# Patient Record
Sex: FEMALE | Race: WHITE | NOT HISPANIC OR LATINO | Employment: FULL TIME
[De-identification: names, ages, dates, MRNs, and addresses within clinical notes are randomized per-mention and may not be internally consistent; named-entity substitution may affect disease eponyms.]

---

## 2018-01-27 ENCOUNTER — HEALTH MAINTENANCE LETTER (OUTPATIENT)
Age: 23
End: 2018-01-27

## 2021-01-14 ENCOUNTER — VIRTUAL VISIT (OUTPATIENT)
Dept: FAMILY MEDICINE | Facility: CLINIC | Age: 26
End: 2021-01-14
Payer: COMMERCIAL

## 2021-01-14 DIAGNOSIS — Z11.3 SCREENING FOR STDS (SEXUALLY TRANSMITTED DISEASES): ICD-10-CM

## 2021-01-14 DIAGNOSIS — F33.0 MILD EPISODE OF RECURRENT MAJOR DEPRESSIVE DISORDER (H): Primary | ICD-10-CM

## 2021-01-14 PROCEDURE — 96127 BRIEF EMOTIONAL/BEHAV ASSMT: CPT | Performed by: PHYSICIAN ASSISTANT

## 2021-01-14 PROCEDURE — 99204 OFFICE O/P NEW MOD 45 MIN: CPT | Mod: 95 | Performed by: PHYSICIAN ASSISTANT

## 2021-01-14 RX ORDER — CITALOPRAM HYDROBROMIDE 20 MG/1
20 TABLET ORAL DAILY
Qty: 30 TABLET | Refills: 1 | Status: SHIPPED | OUTPATIENT
Start: 2021-01-14 | End: 2021-03-18

## 2021-01-14 ASSESSMENT — PATIENT HEALTH QUESTIONNAIRE - PHQ9
5. POOR APPETITE OR OVEREATING: NOT AT ALL
SUM OF ALL RESPONSES TO PHQ QUESTIONS 1-9: 4

## 2021-01-14 ASSESSMENT — ANXIETY QUESTIONNAIRES
6. BECOMING EASILY ANNOYED OR IRRITABLE: NOT AT ALL
IF YOU CHECKED OFF ANY PROBLEMS ON THIS QUESTIONNAIRE, HOW DIFFICULT HAVE THESE PROBLEMS MADE IT FOR YOU TO DO YOUR WORK, TAKE CARE OF THINGS AT HOME, OR GET ALONG WITH OTHER PEOPLE: NOT DIFFICULT AT ALL
GAD7 TOTAL SCORE: 0
1. FEELING NERVOUS, ANXIOUS, OR ON EDGE: NOT AT ALL
7. FEELING AFRAID AS IF SOMETHING AWFUL MIGHT HAPPEN: NOT AT ALL
2. NOT BEING ABLE TO STOP OR CONTROL WORRYING: NOT AT ALL
5. BEING SO RESTLESS THAT IT IS HARD TO SIT STILL: NOT AT ALL
3. WORRYING TOO MUCH ABOUT DIFFERENT THINGS: NOT AT ALL

## 2021-01-14 NOTE — PROGRESS NOTES
CAT is a 25 year old who is being evaluated via a billable video visit.      How would you like to obtain your AVS? MyChart  If the video visit is dropped, the invitation should be resent by: Text to cell phone: [phone]  Will anyone else be joining your video visit? No    Video Start Time: 9:06 AM  Assessment & Plan     Mild episode of recurrent major depressive disorder (H)  Restart Celexa- 10mg for 5-7 days and then increase.  Follow up video visit in one month, sooner prn.  She is interested in counseling as well.  Referral made today.  - citalopram (CELEXA) 20 MG tablet; Take 1 tablet (20 mg) by mouth daily  - MENTAL HEALTH REFERRAL  - Adult; Outpatient Treatment; Individual/Couples/Family/Group Therapy/Health Psychology[place] [facility] [place] [facility] [phone]; We will contact you to schedule the appointment or please call with any questions    Screening for STDs (sexually transmitted diseases)  Due- future order.  Due for PAP and physical as well.  - NEISSERIA GONORRHOEA PCR; Future  - CHLAMYDIA TRACHOMATIS PCR; Future          Return in about 1 month (around 2/14/2021) for depression/anxiety med check.    [facility]    Subjective     CAT is a 25 year old who presents to clinic today for the following health issues:    HPI       Depression and Anxiety Follow-Up    How are you doing with your depression since your last visit? Worsened    How are you doing with your anxiety since your last visit?  Worsened     Are you having other symptoms that might be associated with depression or anxiety? Yes:  hopelessness, feeling worthless, irritable, overwhelmed    Have you had a significant life event? Relationship Concerns, Job Concerns and Financial Concerns     Do you have any concerns with your use of alcohol or other drugs? No    Social History     Tobacco Use     Smoking status: Passive Smoke Exposure - Never Smoker     Smokeless tobacco: Never Used      Tobacco comment: second hand exposure-mom smokes in the house.    Substance Use Topics     Alcohol use: No     Alcohol/week: 0.0 standard drinks     Drug use: No     PHQ 9/2/2015 1/14/2021   PHQ-9 Total Score 2 4   Q9: Thoughts of better off dead/self-harm past 2 weeks Not at all Not at all     DORIS-7 SCORE 8/6/2015 9/2/2015 1/14/2021   Total Score 2 - -   Total Score - 1 0         How many servings of fruits and vegetables do you eat daily?  2-3    On average, how many sweetened beverages do you drink each day (Examples: soda, juice, sweet tea, etc.  Do NOT count diet or artificially sweetened beverages)?   0    How many days per week do you exercise enough to make your heart beat faster? 3 or less    How many minutes a day do you exercise enough to make your heart beat faster? 9 or less    How many days per week do you miss taking your medication? 0    Cat is calling in today via video call to discuss restarting her antidepressant medication.  She has been on Celexa in the past- last taken when she was about 20 which was 5 years ago.  She had to stop when she lost her insurance coverage.  She has now been struggling with her mood over several years and is interested in restarting her medication.      Review of Systems   Constitutional, HEENT, cardiovascular, pulmonary, gi and gu systems are negative, except as otherwise noted.      Objective           Vitals:  No vitals were obtained today due to virtual visit.    Physical Exam   GENERAL: Healthy, alert and no distress  EYES: Eyes grossly normal to inspection.  No discharge or erythema, or obvious scleral/conjunctival abnormalities.  RESP: No audible wheeze, cough, or visible cyanosis.  No visible retractions or increased work of breathing.    SKIN: Visible skin clear. No significant rash, abnormal pigmentation or lesions.  NEURO: Cranial nerves grossly intact.  Mentation and speech appropriate for age.  PSYCH: Mentation appears normal, affect normal/bright,  judgement and insight intact, normal speech and appearance well-groomed.          Video-Visit Details    Type of service:  Video Visit    Video End Time:9:16 AM    Originating Location (pt. Location): Home    Distant Location (provider location):  [facility]     Platform used for Video Visit: Jayesh

## 2021-01-15 ASSESSMENT — ANXIETY QUESTIONNAIRES: GAD7 TOTAL SCORE: 0

## 2021-03-18 ENCOUNTER — OFFICE VISIT (OUTPATIENT)
Dept: FAMILY MEDICINE | Facility: CLINIC | Age: 26
End: 2021-03-18
Payer: COMMERCIAL

## 2021-03-18 VITALS
TEMPERATURE: 98.1 F | HEART RATE: 103 BPM | WEIGHT: 261.1 LBS | HEIGHT: 63 IN | SYSTOLIC BLOOD PRESSURE: 108 MMHG | DIASTOLIC BLOOD PRESSURE: 78 MMHG | BODY MASS INDEX: 46.26 KG/M2 | OXYGEN SATURATION: 97 % | RESPIRATION RATE: 17 BRPM

## 2021-03-18 DIAGNOSIS — F33.0 MILD EPISODE OF RECURRENT MAJOR DEPRESSIVE DISORDER (H): ICD-10-CM

## 2021-03-18 PROCEDURE — 99213 OFFICE O/P EST LOW 20 MIN: CPT | Performed by: PHYSICIAN ASSISTANT

## 2021-03-18 RX ORDER — CITALOPRAM HYDROBROMIDE 20 MG/1
20 TABLET ORAL DAILY
Qty: 90 TABLET | Refills: 0 | Status: SHIPPED | OUTPATIENT
Start: 2021-03-18 | End: 2021-07-05

## 2021-03-18 ASSESSMENT — ANXIETY QUESTIONNAIRES
3. WORRYING TOO MUCH ABOUT DIFFERENT THINGS: NOT AT ALL
7. FEELING AFRAID AS IF SOMETHING AWFUL MIGHT HAPPEN: NOT AT ALL
6. BECOMING EASILY ANNOYED OR IRRITABLE: SEVERAL DAYS
2. NOT BEING ABLE TO STOP OR CONTROL WORRYING: NOT AT ALL
IF YOU CHECKED OFF ANY PROBLEMS ON THIS QUESTIONNAIRE, HOW DIFFICULT HAVE THESE PROBLEMS MADE IT FOR YOU TO DO YOUR WORK, TAKE CARE OF THINGS AT HOME, OR GET ALONG WITH OTHER PEOPLE: NOT DIFFICULT AT ALL
1. FEELING NERVOUS, ANXIOUS, OR ON EDGE: SEVERAL DAYS
5. BEING SO RESTLESS THAT IT IS HARD TO SIT STILL: NOT AT ALL
GAD7 TOTAL SCORE: 2

## 2021-03-18 ASSESSMENT — MIFFLIN-ST. JEOR: SCORE: 1903.47

## 2021-03-18 ASSESSMENT — PATIENT HEALTH QUESTIONNAIRE - PHQ9
5. POOR APPETITE OR OVEREATING: NOT AT ALL
SUM OF ALL RESPONSES TO PHQ QUESTIONS 1-9: 4

## 2021-03-18 NOTE — PROGRESS NOTES
"    Assessment & Plan     Mild episode of recurrent major depressive disorder (H)  Improved.  She will continue with 20mg dose. She has a counseling appointment set up for end of the month. Follow up appointment made for physical next month.  She is due for PAP which has never been done.  Will follow up regarding mood at that time as well.  - citalopram (CELEXA) 20 MG tablet; Take 1 tablet (20 mg) by mouth daily       BMI:   Estimated body mass index is 46.25 kg/m  as calculated from the following:    Height as of this encounter: 1.6 m (5' 3\").    Weight as of this encounter: 118.4 kg (261 lb 1.6 oz).   Weight management plan: Discussed healthy diet and exercise guidelines      Return in about 1 month (around 4/18/2021) for Physical Exam- Wellness.    KAITLYN Desir [facility] BEATRIS Mcintyre is a 25 year old who presents for the following health issues    HPI     Depression Followup    How are you doing with your depression since your last visit? Improved, not as on edge with everything. Not as irritable. Not as much family drama which helps.    Are you having other symptoms that might be associated with depression? No    Have you had a significant life event?  OTHER: 16 yr old cousin had a baby     Are you feeling anxious or having panic attacks?   No    Do you have any concerns with your use of alcohol or other drugs? No    Social History     Tobacco Use     Smoking status: Passive Smoke Exposure - Never Smoker     Smokeless tobacco: Never Used     Tobacco comment: second hand exposure-mom smokes in the house.    Substance Use Topics     Alcohol use: No     Alcohol/week: 0.0 standard drinks     Drug use: No     PHQ 9/2/2015 1/14/2021 3/18/2021   PHQ-9 Total Score 2 4 4   Q9: Thoughts of better off dead/self-harm past 2 weeks Not at all Not at all Not at all     DORIS-7 SCORE 9/2/2015 1/14/2021 3/18/2021   Total Score - - -   Total Score 1 0 2         Review of Systems " "  Constitutional, HEENT, cardiovascular, pulmonary, gi and gu systems are negative, except as otherwise noted.      Objective    /78 (BP Location: Right arm, Patient Position: Sitting, Cuff Size: Adult Large)   Pulse 103   Temp 98.1  F (36.7  C) (Oral)   Resp 17   Ht 1.6 m (5' 3\")   Wt 118.4 kg (261 lb 1.6 oz)   LMP 03/11/2021   SpO2 97%   BMI 46.25 kg/m    Body mass index is 46.25 kg/m .  Physical Exam   GENERAL: healthy, alert and no distress  MS: no gross musculoskeletal defects noted, no edema  SKIN: no suspicious lesions or rashes  PSYCH: mentation appears normal, affect normal/bright        "

## 2021-03-19 ASSESSMENT — ANXIETY QUESTIONNAIRES: GAD7 TOTAL SCORE: 2

## 2021-03-20 ENCOUNTER — HEALTH MAINTENANCE LETTER (OUTPATIENT)
Age: 26
End: 2021-03-20

## 2021-03-31 ENCOUNTER — VIRTUAL VISIT (OUTPATIENT)
Dept: PSYCHOLOGY | Facility: CLINIC | Age: 26
End: 2021-03-31
Payer: COMMERCIAL

## 2021-03-31 DIAGNOSIS — F43.21 ADJUSTMENT DISORDER WITH DEPRESSED MOOD: Primary | ICD-10-CM

## 2021-03-31 PROCEDURE — 90791 PSYCH DIAGNOSTIC EVALUATION: CPT | Mod: 95 | Performed by: SOCIAL WORKER

## 2021-03-31 ASSESSMENT — ANXIETY QUESTIONNAIRES
6. BECOMING EASILY ANNOYED OR IRRITABLE: NOT AT ALL
3. WORRYING TOO MUCH ABOUT DIFFERENT THINGS: NOT AT ALL
2. NOT BEING ABLE TO STOP OR CONTROL WORRYING: NOT AT ALL
IF YOU CHECKED OFF ANY PROBLEMS ON THIS QUESTIONNAIRE, HOW DIFFICULT HAVE THESE PROBLEMS MADE IT FOR YOU TO DO YOUR WORK, TAKE CARE OF THINGS AT HOME, OR GET ALONG WITH OTHER PEOPLE: NOT DIFFICULT AT ALL
1. FEELING NERVOUS, ANXIOUS, OR ON EDGE: NOT AT ALL
7. FEELING AFRAID AS IF SOMETHING AWFUL MIGHT HAPPEN: NOT AT ALL
5. BEING SO RESTLESS THAT IT IS HARD TO SIT STILL: NOT AT ALL
GAD7 TOTAL SCORE: 0

## 2021-03-31 ASSESSMENT — COLUMBIA-SUICIDE SEVERITY RATING SCALE - C-SSRS
1. IN THE PAST MONTH, HAVE YOU WISHED YOU WERE DEAD OR WISHED YOU COULD GO TO SLEEP AND NOT WAKE UP?: NO
ATTEMPT PAST THREE MONTHS: NO
ATTEMPT LIFETIME: NO
1. IN THE PAST MONTH, HAVE YOU WISHED YOU WERE DEAD OR WISHED YOU COULD GO TO SLEEP AND NOT WAKE UP?: NO
2. HAVE YOU ACTUALLY HAD ANY THOUGHTS OF KILLING YOURSELF?: NO
2. HAVE YOU ACTUALLY HAD ANY THOUGHTS OF KILLING YOURSELF LIFETIME?: NO

## 2021-03-31 ASSESSMENT — PATIENT HEALTH QUESTIONNAIRE - PHQ9
5. POOR APPETITE OR OVEREATING: NOT AT ALL
SUM OF ALL RESPONSES TO PHQ QUESTIONS 1-9: 1

## 2021-04-01 ASSESSMENT — ANXIETY QUESTIONNAIRES: GAD7 TOTAL SCORE: 0
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
## 2021-04-05 ENCOUNTER — OFFICE VISIT (OUTPATIENT)
Dept: FAMILY MEDICINE | Facility: CLINIC | Age: 26
End: 2021-04-05
Payer: COMMERCIAL

## 2021-04-05 VITALS
WEIGHT: 258.9 LBS | DIASTOLIC BLOOD PRESSURE: 72 MMHG | OXYGEN SATURATION: 98 % | TEMPERATURE: 98.5 F | SYSTOLIC BLOOD PRESSURE: 110 MMHG | HEIGHT: 62 IN | BODY MASS INDEX: 47.64 KG/M2 | HEART RATE: 95 BPM | RESPIRATION RATE: 17 BRPM

## 2021-04-05 DIAGNOSIS — F33.0 MILD EPISODE OF RECURRENT MAJOR DEPRESSIVE DISORDER (H): ICD-10-CM

## 2021-04-05 DIAGNOSIS — Z12.4 CERVICAL CANCER SCREENING: ICD-10-CM

## 2021-04-05 DIAGNOSIS — Z00.00 ENCOUNTER FOR ROUTINE ADULT HEALTH EXAMINATION WITHOUT ABNORMAL FINDINGS: Primary | ICD-10-CM

## 2021-04-05 DIAGNOSIS — Z11.3 SCREENING FOR STDS (SEXUALLY TRANSMITTED DISEASES): ICD-10-CM

## 2021-04-05 DIAGNOSIS — E66.01 MORBID OBESITY (H): ICD-10-CM

## 2021-04-05 LAB
ALBUMIN SERPL-MCNC: 3.5 G/DL (ref 3.4–5)
ALP SERPL-CCNC: 67 U/L (ref 40–150)
ALT SERPL W P-5'-P-CCNC: 34 U/L (ref 0–50)
ANION GAP SERPL CALCULATED.3IONS-SCNC: 3 MMOL/L (ref 3–14)
AST SERPL W P-5'-P-CCNC: 14 U/L (ref 0–45)
BILIRUB SERPL-MCNC: 0.4 MG/DL (ref 0.2–1.3)
BUN SERPL-MCNC: 9 MG/DL (ref 7–30)
CALCIUM SERPL-MCNC: 8.8 MG/DL (ref 8.5–10.1)
CHLORIDE SERPL-SCNC: 108 MMOL/L (ref 94–109)
CHOLEST SERPL-MCNC: 197 MG/DL
CO2 SERPL-SCNC: 28 MMOL/L (ref 20–32)
CREAT SERPL-MCNC: 0.7 MG/DL (ref 0.52–1.04)
ERYTHROCYTE [DISTWIDTH] IN BLOOD BY AUTOMATED COUNT: 12.7 % (ref 10–15)
GFR SERPL CREATININE-BSD FRML MDRD: >90 ML/MIN/{1.73_M2}
GLUCOSE SERPL-MCNC: 92 MG/DL (ref 70–99)
HCT VFR BLD AUTO: 41.8 % (ref 35–47)
HDLC SERPL-MCNC: 49 MG/DL
HGB BLD-MCNC: 13.5 G/DL (ref 11.7–15.7)
LDLC SERPL CALC-MCNC: 123 MG/DL
MCH RBC QN AUTO: 29.9 PG (ref 26.5–33)
MCHC RBC AUTO-ENTMCNC: 32.3 G/DL (ref 31.5–36.5)
MCV RBC AUTO: 93 FL (ref 78–100)
NONHDLC SERPL-MCNC: 148 MG/DL
PLATELET # BLD AUTO: 304 10E9/L (ref 150–450)
POTASSIUM SERPL-SCNC: 3.5 MMOL/L (ref 3.4–5.3)
PROT SERPL-MCNC: 7.9 G/DL (ref 6.8–8.8)
RBC # BLD AUTO: 4.52 10E12/L (ref 3.8–5.2)
SODIUM SERPL-SCNC: 139 MMOL/L (ref 133–144)
TRIGL SERPL-MCNC: 126 MG/DL
TSH SERPL DL<=0.005 MIU/L-ACNC: 2.31 MU/L (ref 0.4–4)
WBC # BLD AUTO: 7.5 10E9/L (ref 4–11)

## 2021-04-05 PROCEDURE — 36415 COLL VENOUS BLD VENIPUNCTURE: CPT | Performed by: PHYSICIAN ASSISTANT

## 2021-04-05 PROCEDURE — 80061 LIPID PANEL: CPT | Performed by: PHYSICIAN ASSISTANT

## 2021-04-05 PROCEDURE — 80053 COMPREHEN METABOLIC PANEL: CPT | Performed by: PHYSICIAN ASSISTANT

## 2021-04-05 PROCEDURE — G0145 SCR C/V CYTO,THINLAYER,RESCR: HCPCS | Performed by: PHYSICIAN ASSISTANT

## 2021-04-05 PROCEDURE — 85027 COMPLETE CBC AUTOMATED: CPT | Performed by: PHYSICIAN ASSISTANT

## 2021-04-05 PROCEDURE — 99395 PREV VISIT EST AGE 18-39: CPT | Performed by: PHYSICIAN ASSISTANT

## 2021-04-05 PROCEDURE — 87591 N.GONORRHOEAE DNA AMP PROB: CPT | Performed by: PHYSICIAN ASSISTANT

## 2021-04-05 PROCEDURE — 84443 ASSAY THYROID STIM HORMONE: CPT | Performed by: PHYSICIAN ASSISTANT

## 2021-04-05 PROCEDURE — 87491 CHLMYD TRACH DNA AMP PROBE: CPT | Performed by: PHYSICIAN ASSISTANT

## 2021-04-05 RX ORDER — CITALOPRAM HYDROBROMIDE 20 MG/1
20 TABLET ORAL DAILY
Qty: 90 TABLET | Refills: 3 | Status: CANCELLED | OUTPATIENT
Start: 2021-04-05

## 2021-04-05 ASSESSMENT — ENCOUNTER SYMPTOMS
NERVOUS/ANXIOUS: 0
PALPITATIONS: 0
EYE PAIN: 0
FREQUENCY: 0
WEAKNESS: 0
PARESTHESIAS: 0
JOINT SWELLING: 0
HEADACHES: 0
DIARRHEA: 0
SHORTNESS OF BREATH: 0
ARTHRALGIAS: 0
HEMATOCHEZIA: 0
CONSTIPATION: 0
HEMATURIA: 0
ABDOMINAL PAIN: 0
NAUSEA: 0
MYALGIAS: 0
DIZZINESS: 0
CHILLS: 0
FEVER: 0
COUGH: 0
DYSURIA: 0
HEARTBURN: 0
SORE THROAT: 0

## 2021-04-05 ASSESSMENT — MIFFLIN-ST. JEOR: SCORE: 1881.58

## 2021-04-05 NOTE — PATIENT INSTRUCTIONS
While the state has opened eligibility to all people, our health system will continue to prioritize those groups who are most at risk of exposure to COVID-19 and/or hospitalization due to COVID-19. Once a larger percentage of these groups are vaccinated, we will open vaccine appointments to a larger group.      We are working hard to begin vaccinating more people against COVID-19. Beginning Wednesday, March 31, we are vaccinating:     Individuals age 50 and older.    All healthcare workers, both paid and unpaid.     People age 16 or 18-49 years with certain medical conditions or disabilities listed in Phase 1b tier 4.    People who identify as one or more of the following high-risk groups: Black/, /[place] Native, [place] , /, and /.     If you fit into one of these categories, please log in to [facility] using this link to see if we have an open appointment and schedule an appointment.      If there are no appointments left, you will be unable to schedule.   If you have technical difficulty using [employer], call [phone] for assistance.      As vaccine supply increases and we are able to open appointments to more groups, we will share that information on our website:  [email or web address]. Check this website to stay up to date on COVID-19 vaccination information.

## 2021-04-05 NOTE — PROGRESS NOTES
SUBJECTIVE:   CC: Lizzy Olmstead is an 25 year old woman who presents for preventive health visit.       Patient has been advised of split billing requirements and indicates understanding: Yes  Healthy Habits:     Getting at least 3 servings of Calcium per day:  Yes    Bi-annual eye exam:  Yes    Dental care twice a year:  NO    Sleep apnea or symptoms of sleep apnea:  None    Diet:  Regular (no restrictions)    Frequency of exercise:  None    Taking medications regularly:  Yes    Medication side effects:  None    PHQ-2 Total Score: 0    Additional concerns today:  No    Patient here today for PAP and physical.  Has not had a PAP done at this point yet.  Was just seen for depression follow up- saw a therapist last week which went well.  Has had one HPV vaccine approx 10-11 years ago- still needs one more to complete series.    Today's PHQ-2 Score:   PHQ-2 ( 1999 Pfizer) 4/5/2021   Q1: Little interest or pleasure in doing things 0   Q2: Feeling down, depressed or hopeless 0   PHQ-2 Score 0   Q1: Little interest or pleasure in doing things Not at all   Q2: Feeling down, depressed or hopeless Not at all   PHQ-2 Score 0       Abuse: Current or Past (Physical, Sexual or Emotional) - No  Do you feel safe in your environment? Yes        Social History     Tobacco Use     Smoking status: Passive Smoke Exposure - Never Smoker     Smokeless tobacco: Never Used     Tobacco comment: second hand exposure-mom smokes in the house.    Substance Use Topics     Alcohol use: No     Alcohol/week: 0.0 standard drinks         Alcohol Use 4/5/2021   Prescreen: >3 drinks/day or >7 drinks/week? No       Reviewed orders with patient.  Reviewed health maintenance and updated orders accordingly - Yes  Lab work is in process  Labs reviewed in EPIC    Breast Cancer Screening:        History of abnormal Pap smear: NO - age 21-29 PAP every 3 years recommended     Reviewed and updated as needed this visit by clinical staff  Tobacco  Allergies  "   Med Hx  Surg Hx  Fam Hx  Soc Hx        Reviewed and updated as needed this visit by Provider                Past Medical History:   Diagnosis Date     NONSPECIFIC MEDICAL HISTORY     allergies      Past Surgical History:   Procedure Laterality Date     [facility] NONSPECIFIC PROCEDURE  12/31/97    L long finger cyst removal       Review of Systems   Constitutional: Negative for chills and fever.   HENT: Negative for congestion, ear pain, hearing loss and sore throat.    Eyes: Negative for pain and visual disturbance.   Respiratory: Negative for cough and shortness of breath.    Cardiovascular: Negative for chest pain, palpitations and peripheral edema.   Gastrointestinal: Negative for abdominal pain, constipation, diarrhea, heartburn, hematochezia and nausea.   Genitourinary: Negative for dysuria, frequency, genital sores, hematuria and urgency.   Musculoskeletal: Negative for arthralgias, joint swelling and myalgias.   Skin: Negative for rash.   Neurological: Negative for dizziness, weakness, headaches and paresthesias.   Psychiatric/Behavioral: Negative for mood changes. The patient is not nervous/anxious.           OBJECTIVE:   /72   Pulse 95   Temp 98.5  F (36.9  C) (Oral)   Resp 17   Ht 1.581 m (5' 2.25\")   Wt 117.4 kg (258 lb 14.4 oz)   LMP 03/11/2021   SpO2 98%   BMI 46.97 kg/m    Physical Exam  GENERAL: healthy, alert and no distress  EYES: Eyes grossly normal to inspection, PERRL and conjunctivae and sclerae normal  HENT: ear canals and TM's normal, nose and mouth without ulcers or lesions  NECK: no adenopathy, no asymmetry, masses, or scars and thyroid normal to palpation  RESP: lungs clear to auscultation - no rales, rhonchi or wheezes  BREAST: normal without masses, tenderness or nipple discharge and no palpable axillary masses or adenopathy  CV: regular rate and rhythm, normal S1 S2, no S3 or S4, no murmur, click or rub, no peripheral edema and peripheral pulses strong  ABDOMEN: soft, " "nontender, no hepatosplenomegaly, no masses and bowel sounds normal   (female): normal female external genitalia, normal urethral meatus, vaginal mucosa pink, moist, well rugated, and normal cervix/adnexa/uterus without masses or discharge  MS: no gross musculoskeletal defects noted, no edema  SKIN: no suspicious lesions or rashes  NEURO: Normal strength and tone, mentation intact and speech normal  PSYCH: mentation appears normal, affect normal/bright    Diagnostic Test Results:  Labs reviewed in Epic    ASSESSMENT/PLAN:   1. Encounter for routine adult health examination without abnormal findings    - Lipid panel reflex to direct LDL Fasting  - Comprehensive metabolic panel (BMP + Alb, Alk Phos, ALT, AST, Total. Bili, TP)  - CBC with platelets  - TSH with free T4 reflex    2. Mild episode of recurrent major depressive disorder (H)  Doing well- will follow up in about 3 months for appointment.  Continue with therapy.    3. Morbid obesity (H)  Weight loss is needed.    4. Cervical cancer screening    - Pap imaged thin layer screen only - recommended age 21 - 24 years    5. Screening for STDs (sexually transmitted diseases)    - Chlamydia trachomatis PCR  - Neisseria gonorrhoeae PCR    Patient has been advised of split billing requirements and indicates understanding: Yes  COUNSELING:  Reviewed preventive health counseling, as reflected in patient instructions    Estimated body mass index is 46.97 kg/m  as calculated from the following:    Height as of this encounter: 1.581 m (5' 2.25\").    Weight as of this encounter: 117.4 kg (258 lb 14.4 oz).    Weight management plan: Discussed healthy diet and exercise guidelines    She reports that she is a non-smoker but has been exposed to tobacco smoke. She has never used smokeless tobacco.      Counseling Resources:  ATP IV Guidelines  Pooled Cohorts Equation Calculator  Breast Cancer Risk Calculator  BRCA-Related Cancer Risk Assessment: FHS-7 Tool  FRAX Risk " Assessment  ICSI Preventive Guidelines  Dietary Guidelines for Americans, 2010  USDA's MyPlate  ASA Prophylaxis  Lung CA Screening    KAITLYN Desir [facility]

## 2021-04-06 LAB
C TRACH DNA SPEC QL NAA+PROBE: NEGATIVE
N GONORRHOEA DNA SPEC QL NAA+PROBE: NEGATIVE
SPECIMEN SOURCE: NORMAL
SPECIMEN SOURCE: NORMAL

## 2021-04-07 LAB
COPATH REPORT: NORMAL
PAP: NORMAL

## 2021-05-19 ENCOUNTER — VIRTUAL VISIT (OUTPATIENT)
Dept: PSYCHOLOGY | Facility: CLINIC | Age: 26
End: 2021-05-19
Payer: COMMERCIAL

## 2021-05-19 DIAGNOSIS — F43.21 ADJUSTMENT DISORDER WITH DEPRESSED MOOD: Primary | ICD-10-CM

## 2021-05-19 DIAGNOSIS — F33.0 MDD (MAJOR DEPRESSIVE DISORDER), RECURRENT EPISODE, MILD (H): ICD-10-CM

## 2021-05-19 PROCEDURE — 90834 PSYTX W PT 45 MINUTES: CPT | Mod: 95 | Performed by: SOCIAL WORKER

## 2021-05-20 NOTE — PROGRESS NOTES
Progress Note    Patient Name: Lizzy Olmstead  Date: May 19, 2021         Service Type: Individual      Session Start Time: 4 PM  session End Time: 445     Session Length: 45    Session #: 2    Attendees: Client attended alone    Service Modality:  Video Visit:      Provider verified identity through the following two step process.  Patient provided:  Patient is known previously to provider    Telemedicine Visit: The patient's condition can be safely assessed and treated via synchronous audio and visual telemedicine encounter.      Reason for Telemedicine Visit: Services only offered telehealth    Originating Site (Patient Location): Patient's home    Distant Site (Provider Location): Provider Remote Setting    Consent:  The patient/guardian has verbally consented to: the potential risks and benefits of telemedicine (video visit) versus in person care; bill my insurance or make self-payment for services provided; and responsibility for payment of non-covered services.     Patient would like the video invitation sent by:  My Chart    Mode of Communication:  Video Conference via [employer]    As the provider I attest to compliance with applicable laws and regulations related to telemedicine.     Treatment Plan Last Reviewed: In progress; update and review plan and CGI and Benson August /21  PHQ-9 / DORIS-7 : Today    DATA  Interactive Complexity: No  Crisis: No       Progress Since Last Session (Related to Symptoms / Goals / Homework):   Symptoms: No change Continue to establish baseline for therapy continues mildly depressed    Homework: N/A      Episode of Care Goals: Minimal progress - PREPARATION (Decided to change - considering how); Intervened by negotiating a change plan and determining options / strategies for behavior change, identifying triggers, exploring social supports, and working towards setting a date to begin behavior change     Current / Ongoing Stressors and  Concerns:   Living with mother sister maternal aunt.  Working part-time at the [employer].  Feeling socially isolated and bothered by stressful dynamics within the family and extended family.     Treatment Objective(s) Addressed in This Session:   Exploring areas for treatment goal planning and focus in therapy.  Client acknowledging doing boundary work would be helpful.     Intervention:   Continuing to build rapport, obtaining more information about history and current situation and relationships within the family.  Client continues to try to maintain a friendly relationship with her biological father which sometimes can spark judgment from others.        ASSESSMENT: Current Emotional / Mental Status (status of significant symptoms):   Risk status (Self / Other harm or suicidal ideation)   Patient denies current fears or concerns for personal safety.   Patient denies current or recent suicidal ideation or behaviors.   Patient denies current or recent homicidal ideation or behaviors.   Patient denies current or recent self injurious behavior or ideation.   Patient denies other safety concerns.   Patient reports there has been no change in risk factors since their last session.     Patient reports there has been no change in protective factors since their last session.     Recommended that patient call 911 or go to the local ED should there be a change in any of these risk factors.     Appearance:   Appropriate    Eye Contact:   Good    Psychomotor Behavior: Normal    Attitude:   Cooperative  Interested Friendly   Orientation:   All   Speech    Rate / Production: Normal/ Responsive Emotional Normal     Volume:  Soft    Mood:    Depressed  Irritable  Sad    Affect:    Appropriate    Thought Content:  Clear  Rumination    Thought Form:  Coherent  Goal Directed  Logical    Insight:    Good      Medication Review:   No changes to current psychiatric medication(s)     Medication Compliance:   Yes     Changes in Health  Issues:   None reported     Chemical Use Review:   Substance Use: Chemical use reviewed, no active concerns identified      Tobacco Use: No current tobacco use.      Diagnosis:  1. Adjustment disorder with depressed mood    2. MDD (major depressive disorder), recurrent episode, mild (H)        Collateral Reports Completed:   Not Applicable    PLAN: (Patient Tasks / Therapist Tasks / Other)  Complete treatment plan below  Returns to clinic in mid June but will begin waiting list for cancels until then.        Margarette Hernandez, [facility] May 19, 2021                                                         ______________________________________________________________________    Treatment Plan    Patient's Name: Lizzy Olmstead  YOB: 1995    Date: May 19, 2021    DSM5 Diagnoses: 296.31 (F33.0) Major Depressive Disorder, Recurrent Episode, Mild _ and With anxious distress  Psychosocial / Contextual Factors: Problematic family of origin history; difficulty managing emotions and relationships and healthy manner.  WHODAS:     Referral / Collaboration:  Referral to another professional/service is not indicated at this time..    Anticipated number of session or this episode of care: Evaluate every 90 days      MeasurableTreatment Goal(s) related to diagnosis / functional impairment(s)  Goal 1: Patient will improve management of boundaries with others    I will know I've met my goal when I can function better in spite of a perception of judgment.      Objective #A (Patient Action)    Patient will compile a list of boundaries that they would like to set with others. Client will utilize these boundaries on a regular basis.  Status: New     Intervention(s)  Therapist will teach Strategies for communicating limits and needs.    Objective #B  Patient will learn & utilize at least 1-2 assertive communication skills weekly.  Status: New     Intervention(s)  Therapist will Teach assertiveness and effective communication  skills.    Objective #C  Patient will Identify negative self-talk and behaviors: challenge core beliefs, myths, and actions.  Status: New     Intervention(s)  Therapist will Facilitate processing of her past connecting to current concerns and assisting client in challenging negative beliefs.  We will also assist client in developing strategies for building trust with others.        Patient has reviewed and agreed to the above plan.      Margarette Hernandez, [facility]  May 20, 2021

## 2021-06-22 ENCOUNTER — VIRTUAL VISIT (OUTPATIENT)
Dept: PSYCHOLOGY | Facility: CLINIC | Age: 26
End: 2021-06-22
Payer: COMMERCIAL

## 2021-06-22 DIAGNOSIS — F33.0 MDD (MAJOR DEPRESSIVE DISORDER), RECURRENT EPISODE, MILD (H): Primary | ICD-10-CM

## 2021-06-22 PROCEDURE — 90834 PSYTX W PT 45 MINUTES: CPT | Mod: 95 | Performed by: SOCIAL WORKER

## 2021-06-22 ASSESSMENT — PATIENT HEALTH QUESTIONNAIRE - PHQ9: SUM OF ALL RESPONSES TO PHQ QUESTIONS 1-9: 0

## 2021-06-22 NOTE — PROGRESS NOTES
Progress Note    Patient Name: Lizzy Olmstead  Date: 6/22, 2021         Service Type: Individual      Session Start Time: 12noon  session End Time: 1245     Session Length: 45    Session #: 3    Attendees: Client attended alone    Service Modality:  Video Visit:      Provider verified identity through the following two step process.  Patient provided:  Patient is known previously to provider    Telemedicine Visit: The patient's condition can be safely assessed and treated via synchronous audio and visual telemedicine encounter.      Reason for Telemedicine Visit: Services only offered telehealth    Originating Site (Patient Location): Patient's home    Distant Site (Provider Location): Provider Remote Setting    Consent:  The patient/guardian has verbally consented to: the potential risks and benefits of telemedicine (video visit) versus in person care; bill my insurance or make self-payment for services provided; and responsibility for payment of non-covered services.     Patient would like the video invitation sent by:  My Chart    Mode of Communication:  Video Conference via [employer]    As the provider I attest to compliance with applicable laws and regulations related to telemedicine.     Treatment Plan Last Reviewed: In progress; update and review plan and CGI and [place] August /21  PHQ-9 / DORIS-7 : Today    DATA  Interactive Complexity: No  Crisis: No       Progress Since Last Session (Related to Symptoms / Goals / Homework):   Symptoms: No change Continue to establish baseline for therapy continues mildly depressed    Homework: N/A      Episode of Care Goals: Minimal progress - PREPARATION (Decided to change - considering how); Intervened by negotiating a change plan and determining options / strategies for behavior change, identifying triggers, exploring social supports, and working towards setting a date to begin behavior change     Current / Ongoing Stressors  and Concerns:   Living with mother sister maternal aunt.  Working part-time at the [employer].  Feeling socially isolated and bothered by stressful dynamics within the family and extended family.     Treatment Objective(s) Addressed in This Session:   Exploring areas for treatment goal planning and focus in therapy.  Client acknowledging doing boundary work would be helpful.  Processing information related to extended family's trauma histories.    Intervention:   Continuing to build rapport, obtaining more information about history and current situation and relationships within the family.  Client continues to try to maintain a friendly relationship with her biological father which sometimes can spark judgment from others.  Today: gave hw for her to reflect and write about ways her family trauma has emotionally impacted her.      ASSESSMENT: Current Emotional / Mental Status (status of significant symptoms):   Risk status (Self / Other harm or suicidal ideation)   Patient denies current fears or concerns for personal safety.   Patient denies current or recent suicidal ideation or behaviors.   Patient denies current or recent homicidal ideation or behaviors.   Patient denies current or recent self injurious behavior or ideation.   Patient denies other safety concerns.   Patient reports there has been no change in risk factors since their last session.     Patient reports there has been no change in protective factors since their last session.     Recommended that patient call 911 or go to the local ED should there be a change in any of these risk factors.     Appearance:   Appropriate    Eye Contact:   Good    Psychomotor Behavior: Normal    Attitude:   Cooperative  Interested Friendly   Orientation:   All   Speech    Rate / Production: Normal/ Responsive Emotional Normal     Volume:  Soft    Mood:    Anxious mild   Affect:    Appropriate    Thought Content:  Clear  Rumination    Thought Form:  Coherent  Goal  Directed  Logical    Insight:    Good      Medication Review:   No changes to current psychiatric medication(s)     Medication Compliance:   Yes     Changes in Health Issues:   None reported     Chemical Use Review:   Substance Use: Chemical use reviewed, no active concerns identified      Tobacco Use: No current tobacco use.      Diagnosis:  1. MDD (major depressive disorder), recurrent episode, mild (H)        Collateral Reports Completed:   Not Applicable    PLAN: (Patient Tasks / Therapist Tasks / Other)  Review tx plan with ct next session  HW given to reflect on ways family trauma has impacted her life..        Margarette Hernandez, [facility] 6/22/2021                                                         ______________________________________________________________________    Treatment Plan    Patient's Name: Lizzy Olmstead  YOB: 1995    Date: May 19, 2021    DSM5 Diagnoses: 296.31 (F33.0) Major Depressive Disorder, Recurrent Episode, Mild _ and With anxious distress  Psychosocial / Contextual Factors: Problematic family of origin history; difficulty managing emotions and relationships and healthy manner.  WHODAS:     Referral / Collaboration:  Referral to another professional/service is not indicated at this time..    Anticipated number of session or this episode of care: Evaluate every 90 days      MeasurableTreatment Goal(s) related to diagnosis / functional impairment(s)  Goal 1: Patient will improve management of boundaries with others    I will know I've met my goal when I can function better in spite of a perception of judgment.      Objective #A (Patient Action)    Patient will compile a list of boundaries that they would like to set with others. Client will utilize these boundaries on a regular basis.  Status: New     Intervention(s)  Therapist will teach Strategies for communicating limits and needs.    Objective #B  Patient will learn & utilize at least 1-2 assertive communication skills  weekly.  Status: New     Intervention(s)  Therapist will Teach assertiveness and effective communication skills.    Objective #C  Patient will Identify negative self-talk and behaviors: challenge core beliefs, myths, and actions.  Status: New     Intervention(s)  Therapist will Facilitate processing of her past connecting to current concerns and assisting client in challenging negative beliefs.  We will also assist client in developing strategies for building trust with others.        Patient has reviewed and agreed to the above plan.      Margarette Hernandez, [facility]  May 20, 2021

## 2021-07-05 ENCOUNTER — OFFICE VISIT (OUTPATIENT)
Dept: FAMILY MEDICINE | Facility: CLINIC | Age: 26
End: 2021-07-05
Payer: COMMERCIAL

## 2021-07-05 VITALS
RESPIRATION RATE: 16 BRPM | SYSTOLIC BLOOD PRESSURE: 102 MMHG | HEART RATE: 88 BPM | TEMPERATURE: 98.3 F | WEIGHT: 263 LBS | BODY MASS INDEX: 46.6 KG/M2 | HEIGHT: 63 IN | DIASTOLIC BLOOD PRESSURE: 60 MMHG | OXYGEN SATURATION: 99 %

## 2021-07-05 DIAGNOSIS — F33.0 MILD EPISODE OF RECURRENT MAJOR DEPRESSIVE DISORDER (H): ICD-10-CM

## 2021-07-05 PROCEDURE — 99213 OFFICE O/P EST LOW 20 MIN: CPT | Performed by: PHYSICIAN ASSISTANT

## 2021-07-05 RX ORDER — CITALOPRAM HYDROBROMIDE 20 MG/1
20 TABLET ORAL DAILY
Qty: 90 TABLET | Refills: 1 | Status: SHIPPED | OUTPATIENT
Start: 2021-07-05

## 2021-07-05 ASSESSMENT — ANXIETY QUESTIONNAIRES
2. NOT BEING ABLE TO STOP OR CONTROL WORRYING: NOT AT ALL
1. FEELING NERVOUS, ANXIOUS, OR ON EDGE: NOT AT ALL
7. FEELING AFRAID AS IF SOMETHING AWFUL MIGHT HAPPEN: NOT AT ALL
4. TROUBLE RELAXING: NOT AT ALL
5. BEING SO RESTLESS THAT IT IS HARD TO SIT STILL: NOT AT ALL
GAD7 TOTAL SCORE: 1
GAD7 TOTAL SCORE: 1
6. BECOMING EASILY ANNOYED OR IRRITABLE: SEVERAL DAYS
3. WORRYING TOO MUCH ABOUT DIFFERENT THINGS: NOT AT ALL
7. FEELING AFRAID AS IF SOMETHING AWFUL MIGHT HAPPEN: NOT AT ALL
GAD7 TOTAL SCORE: 1

## 2021-07-05 ASSESSMENT — PATIENT HEALTH QUESTIONNAIRE - PHQ9
10. IF YOU CHECKED OFF ANY PROBLEMS, HOW DIFFICULT HAVE THESE PROBLEMS MADE IT FOR YOU TO DO YOUR WORK, TAKE CARE OF THINGS AT HOME, OR GET ALONG WITH OTHER PEOPLE: NOT DIFFICULT AT ALL
SUM OF ALL RESPONSES TO PHQ QUESTIONS 1-9: 1
SUM OF ALL RESPONSES TO PHQ QUESTIONS 1-9: 1

## 2021-07-05 ASSESSMENT — MIFFLIN-ST. JEOR: SCORE: 1912.09

## 2021-07-05 NOTE — PROGRESS NOTES
Assessment & Plan     Mild episode of recurrent major depressive disorder (H)  Doing quite well since starting med and patient enjoying therapy sessions.  Follow up 6 months, med refill given.  - citalopram (CELEXA) 20 MG tablet; Take 1 tablet (20 mg) by mouth daily      Return in about 6 months (around 1/5/2022) for depression/anxiety med check.    KAITLYN Desir [facility] BEATRIS Mcintyre is a 25 year old who presents for the following health issues:    History of Present Illness       Mental Health Follow-up:  Patient presents to follow-up on Depression.Patient's depression since last visit has been:  Better  The patient is not having other symptoms associated with depression.      Any significant life events: No  Patient is not feeling anxious or having panic attacks.  Patient has no concerns about alcohol or drug use.     Social History  Tobacco Use    Smoking status: Passive Smoke Exposure - Never Smoker    Smokeless tobacco: Never Used    Tobacco comment: second hand exposure-mom smokes in the house.   Alcohol use: No    Alcohol/week: 0.0 standard drinks  Drug use: No      Today's PHQ-9         PHQ-9 Total Score:     (P) 1   PHQ-9 Q9 Thoughts of better off dead/self-harm past 2 weeks :   (P) Not at all   Thoughts of suicide or self harm:      Self-harm Plan:        Self-harm Action:          Safety concerns for self or others:             Patient here for follow up of medication and mood.  She is doing quite well.  Finding therapy to be very helpful- trying to make it there q2 weeks.  Things are home are still somewhat stressful but she is handling it all better.      Review of Systems   Constitutional, HEENT, cardiovascular, pulmonary, gi and gu systems are negative, except as otherwise noted.      Objective    /60 (BP Location: Right arm, Patient Position: Sitting, Cuff Size: Adult Large)   Pulse 88   Temp 98.3  F (36.8  C) (Oral)   Resp 16   Ht 1.6 m  "(5' 3\")   Wt 119.3 kg (263 lb)   SpO2 99%   BMI 46.59 kg/m    Body mass index is 46.59 kg/m .  Physical Exam   GENERAL: healthy, alert and no distress  MS: no gross musculoskeletal defects noted, no edema  SKIN: no suspicious lesions or rashes  PSYCH: mentation appears normal, affect normal/bright            "

## 2021-07-06 ENCOUNTER — VIRTUAL VISIT (OUTPATIENT)
Dept: PSYCHOLOGY | Facility: CLINIC | Age: 26
End: 2021-07-06
Payer: COMMERCIAL

## 2021-07-06 DIAGNOSIS — F33.0 MDD (MAJOR DEPRESSIVE DISORDER), RECURRENT EPISODE, MILD (H): Primary | ICD-10-CM

## 2021-07-06 PROCEDURE — 90834 PSYTX W PT 45 MINUTES: CPT | Mod: 95 | Performed by: SOCIAL WORKER

## 2021-07-06 ASSESSMENT — ANXIETY QUESTIONNAIRES: GAD7 TOTAL SCORE: 1

## 2021-07-06 ASSESSMENT — PATIENT HEALTH QUESTIONNAIRE - PHQ9: SUM OF ALL RESPONSES TO PHQ QUESTIONS 1-9: 1

## 2021-07-06 NOTE — PROGRESS NOTES
Progress Note    Patient Name: Lizzy Olmstead  Date: 7/6/2021         Service Type: Individual      Session Start Time: 4pm session End Time: 445     Session Length: 45    Session #: 4    Attendees: Client attended alone    Service Modality:  Video Visit:      Provider verified identity through the following two step process.  Patient provided:  Patient is known previously to provider    Telemedicine Visit: The patient's condition can be safely assessed and treated via synchronous audio and visual telemedicine encounter.      Reason for Telemedicine Visit: Services only offered telehealth    Originating Site (Patient Location): Patient's home    Distant Site (Provider Location): Provider Remote Setting    Consent:  The patient/guardian has verbally consented to: the potential risks and benefits of telemedicine (video visit) versus in person care; bill my insurance or make self-payment for services provided; and responsibility for payment of non-covered services.     Patient would like the video invitation sent by:  My Chart    Mode of Communication:  Video Conference via [employer]    As the provider I attest to compliance with applicable laws and regulations related to telemedicine.     Treatment Plan Last Reviewed: In progress; update and review plan and CGI and [place] August /21  PHQ-9 / DORIS-7 : Today    DATA  Interactive Complexity: No  Crisis: No       Progress Since Last Session (Related to Symptoms / Goals / Homework):   Symptoms: No change Continue to establish baseline for therapy continues mildly depressed    Homework: N/A      Episode of Care Goals: Minimal progress - PREPARATION (Decided to change - considering how); Intervened by negotiating a change plan and determining options / strategies for behavior change, identifying triggers, exploring social supports, and working towards setting a date to begin behavior change     Current / Ongoing Stressors and  Concerns:   Living with mother sister maternal aunt.  Working part-time at the [employer].  Feeling socially isolated and bothered by stressful dynamics within the family and extended family.     Treatment Objective(s) Addressed in This Session:   Exploring areas for treatment goal planning and focus in therapy.  Client acknowledging doing boundary work would be helpful.  Processing information related to extended family's trauma histories.   More workplace drama.   Intervention:   Continuing to build rapport, obtaining more information about history and current situation and relationships within the family.  Client continues to try to maintain a friendly relationship with her biological father which sometimes can spark judgment from others.  Today: Really feels her familys trauma impacted her socially and emotionally. Feels poor self esteem and socially awkward. Deciding she does not like peers she grew up with and has begun to branch out. Made big decision to start dating again after one year. Taking drivers test next week after failing it 4x.     ASSESSMENT: Current Emotional / Mental Status (status of significant symptoms):   Risk status (Self / Other harm or suicidal ideation)   Patient denies current fears or concerns for personal safety.   Patient denies current or recent suicidal ideation or behaviors.   Patient denies current or recent homicidal ideation or behaviors.   Patient denies current or recent self injurious behavior or ideation.   Patient denies other safety concerns.   Patient reports there has been no change in risk factors since their last session.     Patient reports there has been no change in protective factors since their last session.     Recommended that patient call 911 or go to the local ED should there be a change in any of these risk factors.     Appearance:   Appropriate    Eye Contact:   Good    Psychomotor Behavior: Normal    Attitude:   Cooperative  Interested  Friendly   Orientation:   All   Speech    Rate / Production: Normal/ Responsive Emotional Normal     Volume:  Soft    Mood:    Anxious mild   Affect:    Appropriate    Thought Content:  Clear  Rumination    Thought Form:  Coherent  Goal Directed  Logical    Insight:    Good      Medication Review:   No changes to current psychiatric medication(s)     Medication Compliance:   Yes     Changes in Health Issues:   None reported     Chemical Use Review:   Substance Use: Chemical use reviewed, no active concerns identified      Tobacco Use: No current tobacco use.      Diagnosis:  1. MDD (major depressive disorder), recurrent episode, mild (H)        Collateral Reports Completed:   Not Applicable    PLAN: (Patient Tasks / Therapist Tasks / Other)  Moving indirection of doing to create self worth        Margarette Hernandez, [facility] 7/6/2021                                                         ______________________________________________________________________    Treatment Plan    Patient's Name: Lizzy Olmstead  YOB: 1995    Date: May 19, 2021    DSM5 Diagnoses: 296.31 (F33.0) Major Depressive Disorder, Recurrent Episode, Mild _ and With anxious distress  Psychosocial / Contextual Factors: Problematic family of origin history; difficulty managing emotions and relationships and healthy manner.  WHODAS:     Referral / Collaboration:  Referral to another professional/service is not indicated at this time..    Anticipated number of session or this episode of care: Evaluate every 90 days      MeasurableTreatment Goal(s) related to diagnosis / functional impairment(s)  Goal 1: Patient will improve management of boundaries with others    I will know I've met my goal when I can function better in spite of a perception of judgment.      Objective #A (Patient Action)    Patient will compile a list of boundaries that they would like to set with others. Client will utilize these boundaries on a regular basis.  Status:  New     Intervention(s)  Therapist will teach Strategies for communicating limits and needs.    Objective #B  Patient will learn & utilize at least 1-2 assertive communication skills weekly.  Status: New     Intervention(s)  Therapist will Teach assertiveness and effective communication skills.    Objective #C  Patient will Identify negative self-talk and behaviors: challenge core beliefs, myths, and actions.  Status: New     Intervention(s)  Therapist will Facilitate processing of her past connecting to current concerns and assisting client in challenging negative beliefs.  We will also assist client in developing strategies for building trust with others.        Patient has reviewed and agreed to the above plan.      Margarette Hernandez, [facility]  May 20, 2021

## 2021-07-21 ENCOUNTER — VIRTUAL VISIT (OUTPATIENT)
Dept: PSYCHOLOGY | Facility: CLINIC | Age: 26
End: 2021-07-21
Payer: COMMERCIAL

## 2021-07-21 DIAGNOSIS — F33.0 MDD (MAJOR DEPRESSIVE DISORDER), RECURRENT EPISODE, MILD (H): Primary | ICD-10-CM

## 2021-07-21 PROCEDURE — 90834 PSYTX W PT 45 MINUTES: CPT | Mod: 95 | Performed by: SOCIAL WORKER

## 2021-07-21 NOTE — PROGRESS NOTES
Progress Note    Patient Name: Lizzy Olmstead  Date: 7/21/2021         Service Type: Individual      Session Start Time: 11am session End Time: 1145     Session Length: 45    Session #: 5    Attendees: Client attended alone    Service Modality:  Video Visit:      Provider verified identity through the following two step process.  Patient provided:  Patient is known previously to provider    Telemedicine Visit: The patient's condition can be safely assessed and treated via synchronous audio and visual telemedicine encounter.      Reason for Telemedicine Visit: Services only offered telehealth    Originating Site (Patient Location): Patient's home    Distant Site (Provider Location): Provider Remote Setting    Consent:  The patient/guardian has verbally consented to: the potential risks and benefits of telemedicine (video visit) versus in person care; bill my insurance or make self-payment for services provided; and responsibility for payment of non-covered services.     Patient would like the video invitation sent by:  My Chart    Mode of Communication:  Video Conference via [employer]    As the provider I attest to compliance with applicable laws and regulations related to telemedicine.     Treatment Plan Last Reviewed: In progress; update and review plan and CGI and [place] August /21  PHQ-9 / DORIS-7 : Today    DATA  Interactive Complexity: No  Crisis: No       Progress Since Last Session (Related to Symptoms / Goals / Homework):   Symptoms: No change Continue to establish baseline for therapy continues mildly depressed    Homework: N/A      Episode of Care Goals: Minimal progress - ACTION (Actively working towards change); Intervened by reinforcing change plan / affirming steps taken     Current / Ongoing Stressors and Concerns:   Living with mother sister maternal aunt.  Working part-time at the [employer].  Feeling socially isolated and bothered by stressful dynamics  within the family and extended family.     Treatment Objective(s) Addressed in This Session:   Exploring areas for treatment goal planning and focus in therapy.  Client acknowledging doing boundary work would be helpful.  Processing information related to extended family's trauma histories.   Difficulty setting boundaries with grandmother who has dementia and mental illness.   Intervention:   Continuing to build rapport, obtaining more information about history and current situation and relationships within the family.  Client continues to try to maintain a friendly relationship with her biological father which sometimes can spark judgment from others.  Today: More upbeat today very talkative.  Given homework to disengage from problematic phone calls when grandmother is triggered.     ASSESSMENT: Current Emotional / Mental Status (status of significant symptoms):   Risk status (Self / Other harm or suicidal ideation)   Patient denies current fears or concerns for personal safety.   Patient denies current or recent suicidal ideation or behaviors.   Patient denies current or recent homicidal ideation or behaviors.   Patient denies current or recent self injurious behavior or ideation.   Patient denies other safety concerns.   Patient reports there has been no change in risk factors since their last session.     Patient reports there has been no change in protective factors since their last session.     Recommended that patient call 911 or go to the local ED should there be a change in any of these risk factors.     Appearance:   Appropriate    Eye Contact:   Good    Psychomotor Behavior: Normal    Attitude:   Cooperative  Interested Friendly   Orientation:   All   Speech    Rate / Production: Normal/ Responsive Emotional Normal     Volume:  Soft    Mood:    Anxious mild   Affect:    Appropriate    Thought Content:  Clear  Rumination    Thought Form:  Coherent  Goal Directed  Logical    Insight:    Good      Medication  Review:   No changes to current psychiatric medication(s)     Medication Compliance:   Yes     Changes in Health Issues:   None reported     Chemical Use Review:   Substance Use: Chemical use reviewed, no active concerns identified      Tobacco Use: No current tobacco use.      Diagnosis:  1. MDD (major depressive disorder), recurrent episode, mild (H)        Collateral Reports Completed:   Not Applicable    PLAN: (Patient Tasks / Therapist Tasks / Other)  Moving indirection of doing to create self worth  Will practice boundary setting with grandmother is problematic phone calls      Margarette Hernandez, [facility] 7/21/2021                                                         ______________________________________________________________________    Treatment Plan    Patient's Name: Lizzy Olmstead  YOB: 1995    Date: May 19, 2021    DSM5 Diagnoses: 296.31 (F33.0) Major Depressive Disorder, Recurrent Episode, Mild _ and With anxious distress  Psychosocial / Contextual Factors: Problematic family of origin history; difficulty managing emotions and relationships and healthy manner.  WHODAS:     Referral / Collaboration:  Referral to another professional/service is not indicated at this time..    Anticipated number of session or this episode of care: Evaluate every 90 days      MeasurableTreatment Goal(s) related to diagnosis / functional impairment(s)  Goal 1: Patient will improve management of boundaries with others    I will know I've met my goal when I can function better in spite of a perception of judgment.      Objective #A (Patient Action)    Patient will compile a list of boundaries that they would like to set with others. Client will utilize these boundaries on a regular basis.  Status: New     Intervention(s)  Therapist will teach Strategies for communicating limits and needs.    Objective #B  Patient will learn & utilize at least 1-2 assertive communication skills weekly.  Status: New      Intervention(s)  Therapist will Teach assertiveness and effective communication skills.    Objective #C  Patient will Identify negative self-talk and behaviors: challenge core beliefs, myths, and actions.  Status: New     Intervention(s)  Therapist will Facilitate processing of her past connecting to current concerns and assisting client in challenging negative beliefs.  We will also assist client in developing strategies for building trust with others.        Patient has reviewed and agreed to the above plan.      Margarette Hernandez, [facility]  May 20, 2021

## 2021-08-03 ENCOUNTER — VIRTUAL VISIT (OUTPATIENT)
Dept: PSYCHOLOGY | Facility: CLINIC | Age: 26
End: 2021-08-03
Payer: COMMERCIAL

## 2021-08-03 DIAGNOSIS — F33.0 MDD (MAJOR DEPRESSIVE DISORDER), RECURRENT EPISODE, MILD (H): Primary | ICD-10-CM

## 2021-08-03 PROCEDURE — 90834 PSYTX W PT 45 MINUTES: CPT | Mod: 95 | Performed by: SOCIAL WORKER

## 2021-08-03 NOTE — PROGRESS NOTES
Progress Note    Patient Name: Lizzy Olmstead  Date: 8/3/21         Service Type: Individual      Session Start Time: 4pm session End Time: 445     Session Length: 45    Session #: 6    Attendees: Client attended alone    Service Modality:  Video Visit:      Provider verified identity through the following two step process.  Patient provided:  Patient is known previously to provider    Telemedicine Visit: The patient's condition can be safely assessed and treated via synchronous audio and visual telemedicine encounter.      Reason for Telemedicine Visit: Services only offered telehealth    Originating Site (Patient Location): Patient's home    Distant Site (Provider Location): Provider Remote Setting    Consent:  The patient/guardian has verbally consented to: the potential risks and benefits of telemedicine (video visit) versus in person care; bill my insurance or make self-payment for services provided; and responsibility for payment of non-covered services.     Patient would like the video invitation sent by:  My Chart    Mode of Communication:  Video Conference via [employer]    As the provider I attest to compliance with applicable laws and regulations related to telemedicine.     Treatment Plan Last Reviewed: In progress; update and review plan and CGI and [place] August /21  PHQ-9 / DORIS-7 : Today    DATA  Interactive Complexity: No  Crisis: No       Progress Since Last Session (Related to Symptoms / Goals / Homework):   Symptoms: No change Continue to establish baseline for therapy continues mildly depressed    Homework: N/A      Episode of Care Goals: Minimal progress - ACTION (Actively working towards change); Intervened by reinforcing change plan / affirming steps taken     Current / Ongoing Stressors and Concerns:   Living with mother sister maternal aunt.  Working part-time at the [employer].  Feeling socially isolated and bothered by stressful dynamics within  the family and extended family.     Treatment Objective(s) Addressed in This Session:   Continued work and progress with setting goals in family and on the job  Client acknowledging doing boundary work is helpful.  Processing information related to extended family's trauma histories.   Difficulty setting boundaries with grandmother who has dementia and mental illness. Isn't getting to processing feelings yet.   Intervention:   Continuing to build rapport, obtaining more information about history and current situation and relationships within the family.  Client continues to try to maintain a friendly relationship with her biological father which sometimes can spark judgment from others.  Today: More upbeat today very talkative.  Able to follow through with hw. Very assertive with others;show leadership qualities.     ASSESSMENT: Current Emotional / Mental Status (status of significant symptoms):   Risk status (Self / Other harm or suicidal ideation)   Patient denies current fears or concerns for personal safety.   Patient denies current or recent suicidal ideation or behaviors.   Patient denies current or recent homicidal ideation or behaviors.   Patient denies current or recent self injurious behavior or ideation.   Patient denies other safety concerns.   Patient reports there has been no change in risk factors since their last session.     Patient reports there has been no change in protective factors since their last session.     Recommended that patient call 911 or go to the local ED should there be a change in any of these risk factors.     Appearance:   Appropriate    Eye Contact:   Good    Psychomotor Behavior: Normal    Attitude:   Cooperative  Interested Friendly   Orientation:   All   Speech    Rate / Production: Normal/ Responsive Emotional Normal     Volume:  Soft    Mood:    Anxious mild   Affect:    Appropriate    Thought Content:  Clear  Rumination    Thought Form:  Coherent  Goal Directed  Logical     Insight:    Good      Medication Review:   No changes to current psychiatric medication(s)     Medication Compliance:   Yes     Changes in Health Issues:   None reported     Chemical Use Review:   Substance Use: Chemical use reviewed, no active concerns identified      Tobacco Use: No current tobacco use.      Diagnosis:  1. MDD (major depressive disorder), recurrent episode, mild (H)        Collateral Reports Completed:   Not Applicable    PLAN: (Patient Tasks / Therapist Tasks / Other)  Moving indirection of doing to create self worth  Will practice boundary setting with grandmother is problematic phone calls as well as with rest of family and with co workers      Margarette Hernandez, [facility] 8/3/21                                                         ______________________________________________________________________    Treatment Plan    Patient's Name: Lizzy Olmstead  YOB: 1995    Date: May 19, 2021    DSM5 Diagnoses: 296.31 (F33.0) Major Depressive Disorder, Recurrent Episode, Mild _ and With anxious distress  Psychosocial / Contextual Factors: Problematic family of origin history; difficulty managing emotions and relationships and healthy manner.  WHODAS:     Referral / Collaboration:  Referral to another professional/service is not indicated at this time..    Anticipated number of session or this episode of care: Evaluate every 90 days      MeasurableTreatment Goal(s) related to diagnosis / functional impairment(s)  Goal 1: Patient will improve management of boundaries with others    I will know I've met my goal when I can function better in spite of a perception of judgment.      Objective #A (Patient Action)    Patient will compile a list of boundaries that they would like to set with others. Client will utilize these boundaries on a regular basis.  Status: New     Intervention(s)  Therapist will teach Strategies for communicating limits and needs.    Objective #B  Patient will learn &  utilize at least 1-2 assertive communication skills weekly.  Status: New     Intervention(s)  Therapist will Teach assertiveness and effective communication skills.    Objective #C  Patient will Identify negative self-talk and behaviors: challenge core beliefs, myths, and actions.  Status: New     Intervention(s)  Therapist will Facilitate processing of her past connecting to current concerns and assisting client in challenging negative beliefs.  We will also assist client in developing strategies for building trust with others.        Patient has reviewed and agreed to the above plan.      [facility] GUADALU[facility] Benavidez  May 20, 2021

## 2021-08-20 ENCOUNTER — VIRTUAL VISIT (OUTPATIENT)
Dept: PSYCHOLOGY | Facility: CLINIC | Age: 26
End: 2021-08-20
Payer: COMMERCIAL

## 2021-08-20 DIAGNOSIS — F33.0 MDD (MAJOR DEPRESSIVE DISORDER), RECURRENT EPISODE, MILD (H): Primary | ICD-10-CM

## 2021-08-20 PROCEDURE — 90834 PSYTX W PT 45 MINUTES: CPT | Mod: 95 | Performed by: SOCIAL WORKER

## 2021-08-20 NOTE — PROGRESS NOTES
Progress Note    Patient Name: Lizzy Olmstead  Date: 8/20/21         Service Type: Individual      Session Start Time: 230pm session End Time: 315     Session Length: 45    Session #: 7    Attendees: Client attended alone    Service Modality:  Video Visit:      Provider verified identity through the following two step process.  Patient provided:  Patient is known previously to provider    Telemedicine Visit: The patient's condition can be safely assessed and treated via synchronous audio and visual telemedicine encounter.      Reason for Telemedicine Visit: Services only offered telehealth    Originating Site (Patient Location): Patient's home    Distant Site (Provider Location): Provider Remote Setting    Consent:  The patient/guardian has verbally consented to: the potential risks and benefits of telemedicine (video visit) versus in person care; bill my insurance or make self-payment for services provided; and responsibility for payment of non-covered services.     Patient would like the video invitation sent by:  My Chart    Mode of Communication:  Video Conference via [employer]    As the provider I attest to compliance with applicable laws and regulations related to telemedicine.     Treatment Plan Last Reviewed: In progress; update and review plan and CGI and [place] 11 /21  PHQ-9 / DORIS-7 : Today    DATA  Interactive Complexity: No  Crisis: No       Progress Since Last Session (Related to Symptoms / Goals / Homework):   Symptoms: stable    Homework: N/A      Episode of Care Goals: Minimal progress - ACTION (Actively working towards change); Intervened by reinforcing change plan / affirming steps taken  Overwhelmed by extended family drama and stressors. Tends to take these on as her own to fix/fight. Continues to work on establishing boundaries.   Current / Ongoing Stressors and Concerns:   Living with mother sister maternal aunt.  Working part-time at the [employer].  Feeling socially isolated and bothered by stressful dynamics within the family and extended family.     Treatment Objective(s) Addressed in This Session:   Continued work and progress with setting goals in family and on the job  Client acknowledging doing boundary work is helpful.  Processing information related to extended family's trauma histories.   Some redirection today towards self and her own life goals.   Intervention:   Continuing to build rapport, obtaining more information about history and current situation and relationships within the family.  Client continues to try to maintain a friendly relationship with her biological father which sometimes can spark judgment from others.  Today: turns 26 in November. Discussion around what she wants out of life and where shes going with work and/or education. Agreeing her first step is to take and pass drivers lic test she has tried to pass 5 times.   ASSESSMENT: Current Emotional / Mental Status (status of significant symptoms):   Risk status (Self / Other harm or suicidal ideation)   Patient denies current fears or concerns for personal safety.   Patient denies current or recent suicidal ideation or behaviors.   Patient denies current or recent homicidal ideation or behaviors.   Patient denies current or recent self injurious behavior or ideation.   Patient denies other safety concerns.   Patient reports there has been no change in risk factors since their last session.     Patient reports there has been no change in protective factors since their last session.     Recommended that patient call 911 or go to the local ED should there be a change in any of these risk factors.     Appearance:   Appropriate    Eye Contact:   Good    Psychomotor Behavior: Normal    Attitude:   Cooperative  Interested Friendly   Orientation:   All   Speech    Rate / Production: Normal/ Responsive Emotional Normal     Volume:  Soft    Mood:    Anxious  mild   Affect:    Appropriate    Thought Content:  Clear  Rumination    Thought Form:  Coherent  Goal Directed  Logical    Insight:    Good      Medication Review:   No changes to current psychiatric medication(s)     Medication Compliance:   Yes     Changes in Health Issues:   None reported     Chemical Use Review:   Substance Use: Chemical use reviewed, no active concerns identified      Tobacco Use: No current tobacco use.      Diagnosis:  1. MDD (major depressive disorder), recurrent episode, mild (H)        Collateral Reports Completed:   Not Applicable    PLAN: (Patient Tasks / Therapist Tasks / Other)  Moving indirection of doing to create self worth  Will practice boundary setting with grandmother is problematic phone calls as well as with rest of family and with co workers  Will schedule drivers exam    Margarette Hernandez, [facility] 8/20/21                                                         ______________________________________________________________________    Treatment Plan    Patient's Name: Lizzy Olmstead  YOB: 1995    Date: May 19, 2021;8/21    DSM5 Diagnoses: 296.31 (F33.0) Major Depressive Disorder, Recurrent Episode, Mild _ and With anxious distress  Psychosocial / Contextual Factors: Problematic family of origin history; difficulty managing emotions and relationships and healthy manner.  WHODAS:     Referral / Collaboration:  Referral to another professional/service is not indicated at this time..    Anticipated number of session or this episode of care: Evaluate every 90 days      MeasurableTreatment Goal(s) related to diagnosis / functional impairment(s)  Goal 1: Patient will improve management of boundaries with others    I will know I've met my goal when I can function better in spite of a perception of judgment.      Objective #A (Patient Action)    Patient will compile a list of boundaries that they would like to set with others. Client will utilize these boundaries on a  regular basis.  Status: continue     Intervention(s)  Therapist will teach Strategies for communicating limits and needs.    Objective #B  Patient will learn & utilize at least 1-2 assertive communication skills weekly.  Status: cont     Intervention(s)  Therapist will Teach assertiveness and effective communication skills.    Objective #C  Patient will Identify negative self-talk and behaviors: challenge core beliefs, myths, and actions.  Status: cont     Intervention(s)  Therapist will Facilitate processing of her past connecting to current concerns and assisting client in challenging negative beliefs.  We will also assist client in developing strategies for building trust with others.        Patient has reviewed and agreed to the above plan.      Margarette Hernandez, [facility]  May 20, 2021

## 2021-09-03 ENCOUNTER — VIRTUAL VISIT (OUTPATIENT)
Dept: PSYCHOLOGY | Facility: CLINIC | Age: 26
End: 2021-09-03
Payer: COMMERCIAL

## 2021-09-03 DIAGNOSIS — F33.0 MDD (MAJOR DEPRESSIVE DISORDER), RECURRENT EPISODE, MILD (H): Primary | ICD-10-CM

## 2021-09-03 PROCEDURE — 90834 PSYTX W PT 45 MINUTES: CPT | Mod: 95 | Performed by: SOCIAL WORKER

## 2021-09-03 ASSESSMENT — PATIENT HEALTH QUESTIONNAIRE - PHQ9: SUM OF ALL RESPONSES TO PHQ QUESTIONS 1-9: 1

## 2021-09-04 ENCOUNTER — HEALTH MAINTENANCE LETTER (OUTPATIENT)
Age: 26
End: 2021-09-04

## 2021-09-06 NOTE — PROGRESS NOTES
Progress Note    Patient Name: Lizzy Olmstead  Date: 9/3/21         Service Type: Individual      Session Start Time: 230pm session End Time: 315     Session Length: 45    Session #: 8    Attendees: Client attended alone    Service Modality:  Video Visit:      Provider verified identity through the following two step process.  Patient provided:  Patient is known previously to provider    Telemedicine Visit: The patient's condition can be safely assessed and treated via synchronous audio and visual telemedicine encounter.      Reason for Telemedicine Visit: Services only offered telehealth    Originating Site (Patient Location): Patient's home    Distant Site (Provider Location): Provider Remote Setting    Consent:  The patient/guardian has verbally consented to: the potential risks and benefits of telemedicine (video visit) versus in person care; bill my insurance or make self-payment for services provided; and responsibility for payment of non-covered services.     Patient would like the video invitation sent by:  My Chart    Mode of Communication:  Video Conference via [email or web address]    As the provider I attest to compliance with applicable laws and regulations related to telemedicine.     Treatment Plan Last Reviewed: In progress; update and review plan and CGI and [place] 11 /21  PHQ-9 / DORIS-7 : Today    DATA  Interactive Complexity: No  Crisis: No       Progress Since Last Session (Related to Symptoms / Goals / Homework):   Symptoms: stable    Homework: scheduled drivers test/exam for 1/22      Episode of Care Goals: Minimal progress - ACTION (Actively working towards change); Intervened by reinforcing change plan / affirming steps taken  Overwhelmed by extended family drama and stressors. Tends to take these on as her own to fix/fight. Continues to work on establishing boundaries.   Current / Ongoing Stressors and Concerns:   Living with mother sister maternal aunt.   Working part-time at the [employer].  Feeling socially isolated and bothered by stressful dynamics within the family and extended family.     Treatment Objective(s) Addressed in This Session:   Continued work and progress with setting goals in family and on the job  Client acknowledging doing boundary work is helpful.  Processing information related to extended family's trauma histories.   Some redirection towards self and her own life goals. This is difficult for client as she tends to overfocus on extended family's problems. Wonders whether this keeps her from making changes she needs.    Intervention:   Continuing to build rapport, obtaining more information about history and current situation and relationships within the family.  Client continues to try to maintain a friendly relationship with her biological father which sometimes can spark judgment from others.  Today: Goal is to get license and move out of family home to create physical boundary down the road. Distant gmo dx with cancer and a cousin OD'd recently. Ct processing impact of this today in session.  ASSESSMENT: Current Emotional / Mental Status (status of significant symptoms):   Risk status (Self / Other harm or suicidal ideation)   Patient denies current fears or concerns for personal safety.   Patient denies current or recent suicidal ideation or behaviors.   Patient denies current or recent homicidal ideation or behaviors.   Patient denies current or recent self injurious behavior or ideation.   Patient denies other safety concerns.   Patient reports there has been no change in risk factors since their last session.     Patient reports there has been no change in protective factors since their last session.     Recommended that patient call 911 or go to the local ED should there be a change in any of these risk factors.     Appearance:   Appropriate    Eye Contact:   Good    Psychomotor Behavior: Normal    Attitude:   Cooperative  Interested  Friendly   Orientation:   All   Speech    Rate / Production: Normal/ Responsive Emotional Normal     Volume:  Soft    Mood:    Anxious mild irritable   Affect:    Appropriate    Thought Content:  Clear  Rumination    Thought Form:  Coherent  Goal Directed  Logical    Insight:    Good      Medication Review:   No changes to current psychiatric medication(s)     Medication Compliance:   Yes     Changes in Health Issues:   None reported     Chemical Use Review:   Substance Use: Chemical use reviewed, no active concerns identified      Tobacco Use: No current tobacco use.      Diagnosis:  1. MDD (major depressive disorder), recurrent episode, mild (H)        Collateral Reports Completed:   Not Applicable    PLAN: (Patient Tasks / Therapist Tasks / Other)  Moving indirection of doing to create self worth  Will practice boundary setting with grandmother is problematic phone calls as well as with rest of family and with co workers  Will continue to practice coping skills that help her re regulate    Margarettejovita Ruizchad, [facility] 9/3/21                                                         ______________________________________________________________________    Treatment Plan    Patient's Name: Lizzy Olmstead  YOB: 1995    Date: May 19, 2021;8/21    DSM5 Diagnoses: 296.31 (F33.0) Major Depressive Disorder, Recurrent Episode, Mild _ and With anxious distress  Psychosocial / Contextual Factors: Problematic family of origin history; difficulty managing emotions and relationships and healthy manner.  WHODAS:     Referral / Collaboration:  Referral to another professional/service is not indicated at this time..    Anticipated number of session or this episode of care: Evaluate every 90 days      MeasurableTreatment Goal(s) related to diagnosis / functional impairment(s)  Goal 1: Patient will improve management of boundaries with others    I will know I've met my goal when I can function better in spite of a  perception of judgment.      Objective #A (Patient Action)    Patient will compile a list of boundaries that they would like to set with others. Client will utilize these boundaries on a regular basis.  Status: continue     Intervention(s)  Therapist will teach Strategies for communicating limits and needs.    Objective #B  Patient will learn & utilize at least 1-2 assertive communication skills weekly.  Status: cont     Intervention(s)  Therapist will Teach assertiveness and effective communication skills.    Objective #C  Patient will Identify negative self-talk and behaviors: challenge core beliefs, myths, and actions.  Status: cont     Intervention(s)  Therapist will Facilitate processing of her past connecting to current concerns and assisting client in challenging negative beliefs.  We will also assist client in developing strategies for building trust with others.        Patient has reviewed and agreed to the above plan.      Margarette Hernandez, [facility]  May 20, 2021

## 2021-09-10 ENCOUNTER — VIRTUAL VISIT (OUTPATIENT)
Dept: PSYCHOLOGY | Facility: CLINIC | Age: 26
End: 2021-09-10
Payer: COMMERCIAL

## 2021-09-10 DIAGNOSIS — F33.0 MDD (MAJOR DEPRESSIVE DISORDER), RECURRENT EPISODE, MILD (H): Primary | ICD-10-CM

## 2021-09-10 PROCEDURE — 90834 PSYTX W PT 45 MINUTES: CPT | Mod: 95 | Performed by: SOCIAL WORKER

## 2021-09-10 NOTE — PROGRESS NOTES
Progress Note    Patient Name: Lizzy Olmstead  Date: 9/10/21         Service Type: Individual      Session Start Time: 130pm session End Time: 215     Session Length: 45    Session #: 9    Attendees: Client attended alone    Service Modality:  Video Visit:      Provider verified identity through the following two step process.  Patient provided:  Patient is known previously to provider    Telemedicine Visit: The patient's condition can be safely assessed and treated via synchronous audio and visual telemedicine encounter.      Reason for Telemedicine Visit: Services only offered telehealth    Originating Site (Patient Location): Patient's home    Distant Site (Provider Location): Provider Remote Setting    Consent:  The patient/guardian has verbally consented to: the potential risks and benefits of telemedicine (video visit) versus in person care; bill my insurance or make self-payment for services provided; and responsibility for payment of non-covered services.     Patient would like the video invitation sent by:  My Chart    Mode of Communication:  Video Conference via [employer]    As the provider I attest to compliance with applicable laws and regulations related to telemedicine.     Treatment Plan Last Reviewed:  update and review plan and CGI and [place] 11 /21  PHQ-9 / DORIS-7 : Today    DATA  Interactive Complexity: No  Crisis: No       Progress Since Last Session (Related to Symptoms / Goals / Homework):   Symptoms: stable    Homework: scheduled drivers test/exam for 1/22      Episode of Care Goals: Minimal progress - ACTION (Actively working towards change); Intervened by reinforcing change plan / affirming steps taken  Overwhelmed by extended family drama and stressors. Tends to take these on as her own to fix/fight. Continues to work on establishing boundaries.   Current / Ongoing Stressors and Concerns:   Living with mother sister maternal aunt.  Working  part-time at the [employer].  Feeling socially isolated and bothered by stressful dynamics within the family and extended family.     Treatment Objective(s) Addressed in This Session:   Continued work and progress with setting goals in family and on the job  Client acknowledging doing boundary work is helpful.  Processing information related to extended family's trauma histories.   Some redirection towards self and her own life goals. This is difficult for client as she tends to overfocus on extended family's problems. Wonders whether this keeps her from making changes she needs.    Intervention:   Continuing to build rapport, obtaining more information about history and current situation and relationships within the family.  Client continues to try to maintain a friendly relationship with her biological father which sometimes can spark judgment from others.  Today: Goal is to get license and move out of family home to create physical boundary down the road. Some discussion today around ways her family's dysfunctional relationships has made her cynical about her future. Some discussion of her first 2 significant relationships in teen years and last year.  One cheated on her and the other was controlling and selfish. Hasnt had luck using on line formats. Suggested we use future sessions to unpack how to case new people.    ASSESSMENT: Current Emotional / Mental Status (status of significant symptoms):   Risk status (Self / Other harm or suicidal ideation)   Patient denies current fears or concerns for personal safety.   Patient denies current or recent suicidal ideation or behaviors.   Patient denies current or recent homicidal ideation or behaviors.   Patient denies current or recent self injurious behavior or ideation.   Patient denies other safety concerns.   Patient reports there has been no change in risk factors since their last session.     Patient reports there has been no change in protective factors since  their last session.     Recommended that patient call 911 or go to the local ED should there be a change in any of these risk factors.     Appearance:   Appropriate    Eye Contact:   Good    Psychomotor Behavior: Normal    Attitude:   Cooperative  Interested Friendly   Orientation:   All   Speech    Rate / Production: Emotional Talkative    Volume:  Soft    Mood:    Anxious mild    Affect:    Appropriate    Thought Content:  Clear  Rumination    Thought Form:  Coherent  Goal Directed  Logical    Insight:    Good      Medication Review:   No changes to current psychiatric medication(s)     Medication Compliance:   Yes     Changes in Health Issues:   None reported     Chemical Use Review:   Substance Use: Chemical use reviewed, no active concerns identified      Tobacco Use: No current tobacco use.      Diagnosis:  1. MDD (major depressive disorder), recurrent episode, mild (H)        Collateral Reports Completed:   Not Applicable    PLAN: (Patient Tasks / Therapist Tasks / Other)  Moving indirection of doing to create self worth  Will practice boundary setting with grandmother is problematic phone calls as well as with rest of family and with co workers  Will continue to practice coping skills that help her re regulate    Margarette Hernandez, [facility] 9/10/21                                                         ______________________________________________________________________    Treatment Plan    Patient's Name: Lizzy Olmstead  YOB: 1995    Date: May 19, 2021;8/21    DSM5 Diagnoses: 296.31 (F33.0) Major Depressive Disorder, Recurrent Episode, Mild _ and With anxious distress  Psychosocial / Contextual Factors: Problematic family of origin history; difficulty managing emotions and relationships and healthy manner.  WHODAS:     Referral / Collaboration:  Referral to another professional/service is not indicated at this time..    Anticipated number of session or this episode of care: Evaluate every 90  days      MeasurableTreatment Goal(s) related to diagnosis / functional impairment(s)  Goal 1: Patient will improve management of boundaries with others    I will know I've met my goal when I can function better in spite of a perception of judgment.      Objective #A (Patient Action)    Patient will compile a list of boundaries that they would like to set with others. Client will utilize these boundaries on a regular basis.  Status: continue     Intervention(s)  Therapist will teach Strategies for communicating limits and needs.    Objective #B  Patient will learn & utilize at least 1-2 assertive communication skills weekly.  Status: cont     Intervention(s)  Therapist will Teach assertiveness and effective communication skills.    Objective #C  Patient will Identify negative self-talk and behaviors: challenge core beliefs, myths, and actions.  Status: cont     Intervention(s)  Therapist will Facilitate processing of her past connecting to current concerns and assisting client in challenging negative beliefs.  We will also assist client in developing strategies for building trust with others.        Patient has reviewed and agreed to the above plan.      Margarette Hernandez, [facility]  May 20, 2021

## 2021-10-29 ENCOUNTER — VIRTUAL VISIT (OUTPATIENT)
Dept: PSYCHOLOGY | Facility: CLINIC | Age: 26
End: 2021-10-29
Payer: COMMERCIAL

## 2021-10-29 DIAGNOSIS — F33.0 MDD (MAJOR DEPRESSIVE DISORDER), RECURRENT EPISODE, MILD (H): Primary | ICD-10-CM

## 2021-10-29 PROCEDURE — 90834 PSYTX W PT 45 MINUTES: CPT | Mod: 95 | Performed by: SOCIAL WORKER

## 2021-10-29 NOTE — PROGRESS NOTES
Progress Note    Patient Name: Lizzy Olmstead  Date: October 29, 2021         Service Type: Individual      Session Start Time: 230pm session End Time: 315     Session Length: 45    Session #: 10    Attendees: Client attended alone    Service Modality:  Video Visit:      Provider verified identity through the following two step process.  Patient provided:  Patient is known previously to provider    Telemedicine Visit: The patient's condition can be safely assessed and treated via synchronous audio and visual telemedicine encounter.      Reason for Telemedicine Visit: Services only offered telehealth    Originating Site (Patient Location): Patient's home    Distant Site (Provider Location): Provider Remote Setting    Consent:  The patient/guardian has verbally consented to: the potential risks and benefits of telemedicine (video visit) versus in person care; bill my insurance or make self-payment for services provided; and responsibility for payment of non-covered services.     Patient would like the video invitation sent by:  My Chart    Mode of Communication:  Video Conference via [employer]    As the provider I attest to compliance with applicable laws and regulations related to telemedicine.     Treatment Plan Last Reviewed:  update and review plan and CGI and [place] 11 /21  PHQ-9 / DORIS-7 : Today    DATA  Interactive Complexity: No  Crisis: No       Progress Since Last Session (Related to Symptoms / Goals / Homework):   Symptoms: stable    Homework: scheduled drivers test/exam for 1/22      Episode of Care Goals: Minimal progress - ACTION (Actively working towards change); Intervened by reinforcing change plan / affirming steps taken  Overwhelmed by extended family drama and work related stressors. Tends to take these on as her own to fix/fight. Continues to work on establishing boundaries.  May have an opportunity to work at [employer] for more money per hour.   Current /  Ongoing Stressors and Concerns:   Living with mother sister maternal aunt.  Working part-time at the [employer].  Feeling socially isolated and bothered by stressful dynamics within the family and extended family.  Chronic conflict between family members at home.     Treatment Objective(s) Addressed in This Session:   Continued work and progress with setting goals in family and on the job  Client acknowledging doing boundary work is helpful.  Processing information related to extended family's trauma histories.   Some redirection towards self and her own life goals. This is difficult for client as she tends to overfocus on extended family's problems. Wonders whether this keeps her from making changes she needs.   Open to a joint session between her and her mother   Intervention:   Continuing to build rapport, obtaining more information about history and current situation and relationships within the family.  Client continues to try to maintain a friendly relationship with her biological father which sometimes can spark judgment from others.  Today: Generally feeling pretty stuck in family dynamics that can be painful.  Discussed some ways to consider responding to on when on becomes angry with her around house chores.  ASSESSMENT: Current Emotional / Mental Status (status of significant symptoms):   Risk status (Self / Other harm or suicidal ideation)   Patient denies current fears or concerns for personal safety.   Patient denies current or recent suicidal ideation or behaviors.   Patient denies current or recent homicidal ideation or behaviors.   Patient denies current or recent self injurious behavior or ideation.   Patient denies other safety concerns.   Patient reports there has been no change in risk factors since their last session.     Patient reports there has been no change in protective factors since their last session.     Recommended that patient call 911 or go to the local ED should there be a change  in any of these risk factors.     Appearance:   Appropriate    Eye Contact:   Good    Psychomotor Behavior: Normal    Attitude:   Cooperative  Interested Friendly   Orientation:   All   Speech    Rate / Production: Emotional Talkative    Volume:  Soft    Mood:    Anxious mild    Affect:    Appropriate    Thought Content:  Clear  Rumination    Thought Form:  Coherent  Goal Directed  Logical    Insight:    Good      Medication Review:   No changes to current psychiatric medication(s)     Medication Compliance:   Yes     Changes in Health Issues:   None reported     Chemical Use Review:   Substance Use: Chemical use reviewed, no active concerns identified      Tobacco Use: No current tobacco use.      Diagnosis:  1. MDD (major depressive disorder), recurrent episode, mild (H)        Collateral Reports Completed:   Not Applicable    PLAN: (Patient Tasks / Therapist Tasks / Other)  Moving indirection of doing to create self worth  Will practice boundary setting with grandmother is problematic phone calls as well as with rest of family and with co workers  Will continue to practice coping skills that help her re regulate  May consider mother coming into next session  Margarette Hernandez, [facility] 10/29/21                                                         ______________________________________________________________________    Treatment Plan    Patient's Name: Lizzy Olmstead  YOB: 1995    Date: May 19, 2021;8/21    DSM5 Diagnoses: 296.31 (F33.0) Major Depressive Disorder, Recurrent Episode, Mild _ and With anxious distress  Psychosocial / Contextual Factors: Problematic family of origin history; difficulty managing emotions and relationships and healthy manner.  WHODAS:     Referral / Collaboration:  Referral to another professional/service is not indicated at this time..    Anticipated number of session or this episode of care: Evaluate every 90 days      MeasurableTreatment Goal(s) related to diagnosis /  functional impairment(s)  Goal 1: Patient will improve management of boundaries with others    I will know I've met my goal when I can function better in spite of a perception of judgment.      Objective #A (Patient Action)    Patient will compile a list of boundaries that they would like to set with others. Client will utilize these boundaries on a regular basis.  Status: continue     Intervention(s)  Therapist will teach Strategies for communicating limits and needs.    Objective #B  Patient will learn & utilize at least 1-2 assertive communication skills weekly.  Status: cont     Intervention(s)  Therapist will Teach assertiveness and effective communication skills.    Objective #C  Patient will Identify negative self-talk and behaviors: challenge core beliefs, myths, and actions.  Status: cont     Intervention(s)  Therapist will Facilitate processing of her past connecting to current concerns and assisting client in challenging negative beliefs.  We will also assist client in developing strategies for building trust with others.        Patient has reviewed and agreed to the above plan.      Margarette Hernandez, [facility]  May 20, 2021

## 2022-01-19 ENCOUNTER — VIRTUAL VISIT (OUTPATIENT)
Dept: PSYCHOLOGY | Facility: CLINIC | Age: 27
End: 2022-01-19
Payer: COMMERCIAL

## 2022-01-19 DIAGNOSIS — F33.0 MDD (MAJOR DEPRESSIVE DISORDER), RECURRENT EPISODE, MILD (H): Primary | ICD-10-CM

## 2022-01-19 PROCEDURE — 90834 PSYTX W PT 45 MINUTES: CPT | Mod: 95 | Performed by: SOCIAL WORKER

## 2022-01-19 NOTE — PROGRESS NOTES
Progress Note    Patient Name: Lizzy Olmstead  Date: 1/19/22       Service Type: Individual      Session Start Time: 11am session End Time: 1145     Session Length: 45    Session #: 11    Attendees: Client attended alone    Service Modality:  Video Visit:      Provider verified identity through the following two step process.  Patient provided:  Patient is known previously to provider    Telemedicine Visit: The patient's condition can be safely assessed and treated via synchronous audio and visual telemedicine encounter.      Reason for Telemedicine Visit: Services only offered telehealth    Originating Site (Patient Location): Patient's home    Distant Site (Provider Location): Provider Remote Setting    Consent:  The patient/guardian has verbally consented to: the potential risks and benefits of telemedicine (video visit) versus in person care; bill my insurance or make self-payment for services provided; and responsibility for payment of non-covered services.     Patient would like the video invitation sent by:  My Chart    Mode of Communication:  Video Conference via [employer]    As the provider I attest to compliance with applicable laws and regulations related to telemedicine.     Treatment Plan Last Reviewed:  update and review plan today 1/22 and again 4/22  PHQ-9 / DORIS-7 : Today; not seen in 2 and 1/2 months.    DATA  Interactive Complexity: No  Crisis: No       Progress Since Last Session (Related to Symptoms / Goals / Homework):   Symptoms: stable with reported improvement    Homework: scheduled drivers test/exam for 1/22; taking this next week.      Episode of Care Goals: Minimal progress - ACTION (Actively working towards change); Intervened by reinforcing change plan / affirming steps taken  Overwhelmed by extended family drama and work related stressors. Tends to take these on as her own to fix/fight. Continues to work on establishing boundaries.  May have an  opportunity to work at [employer] for more money per hour but wants to get her drivers license first. Has begun dating past 2 weeks.   Current / Ongoing Stressors and Concerns:   Living with mother sister maternal aunt.  Working part-time at the [employer].  Feeling socially isolated and bothered by stressful dynamics within the family and extended family.  Chronic conflict between family members at home.     Treatment Objective(s) Addressed in This Session:   Continued work and progress with setting goals in family and on the job  Client acknowledging doing boundary work is helpful.  Processing information related to extended family's trauma histories.   Needs redirection towards self and her own life goals. This is difficult for client as she tends to overfocus on extended family's problems. Wonders whether this keeps her from making changes she needs.   Knew dating partner at age 19; he was released from detention 2 weeks ago and contacted her.    Intervention:   Motivational Interviewing    MI Intervention: Expressed Empathy/Understanding, Supported Autonomy, Collaboration, Evocation, Open-ended questions, Reflections: simple and complex and Change talk (evoked)     Change Talk Expressed by the Patient: Desire to change Reasons to change Activation Taking steps    Provider Response to Change Talk: E - Evoked more info from patient about behavior change, A - Affirmed patient's thoughts, decisions, or attempts at behavior change, R - Reflected patient's change talk and S - Summarized patient's change talk statements    Encouraging looking for full time work once gets her license as this may enable independence from living at home.    ASSESSMENT: Current Emotional / Mental Status (status of significant symptoms):   Risk status (Self / Other harm or suicidal ideation)   Patient denies current fears or concerns for personal safety.   Patient denies current or recent suicidal ideation or behaviors.   Patient denies  current or recent homicidal ideation or behaviors.   Patient denies current or recent self injurious behavior or ideation.   Patient denies other safety concerns.   Patient reports there has been no change in risk factors since their last session.     Patient reports there has been no change in protective factors since their last session.     Recommended that patient call 911 or go to the local ED should there be a change in any of these risk factors.     Appearance:   Appropriate    Eye Contact:   Good    Psychomotor Behavior: Normal    Attitude:   Cooperative  Interested Friendly   Orientation:   All   Speech    Rate / Production: Emotional Talkative    Volume:  Soft    Mood:    Anxious mild    Affect:    Appropriate    Thought Content:  Clear  Rumination    Thought Form:  Coherent  Goal Directed  Logical    Insight:    Good      Medication Review:   No changes to current psychiatric medication(s)     Medication Compliance:   Yes     Changes in Health Issues:   None reported     Chemical Use Review:   Substance Use: Chemical use reviewed, no active concerns identified      Tobacco Use: No current tobacco use.      Diagnosis:  1. MDD (major depressive disorder), recurrent episode, mild (H)        Collateral Reports Completed:   Not Applicable    PLAN: (Patient Tasks / Therapist Tasks / Other)  Moving indirection of doing to create self worth  Will practice boundary setting with grandmother is problematic phone calls as well as with rest of family and with co workers  Will continue to practice coping skills that help her re regulate  Would like to resume more reg therapy  Margarette Hernandez, [facility] 1/19/22                                                         ______________________________________________________________________    Treatment Plan    Patient's Name: Lizzy Olmstead  YOB: 1995    Date: 1/19/22    DSM5 Diagnoses: 296.31 (F33.0) Major Depressive Disorder, Recurrent Episode, Mild _ and With  anxious distress  Psychosocial / Contextual Factors: Problematic family of origin history; difficulty managing emotions and relationships and healthy manner.  WHODAS:     Referral / Collaboration:  Referral to another professional/service is not indicated at this time..    Anticipated number of session or this episode of care: Evaluate every 90 days      MeasurableTreatment Goal(s) related to diagnosis / functional impairment(s)  Goal 1: Patient will improve management of boundaries with others    I will know I've met my goal when I can function better in spite of a perception of judgment.      Objective #A (Patient Action)    Patient will compile a list of boundaries that they would like to set with others. Client will utilize these boundaries on a regular basis.  Status: continue     Intervention(s)  Therapist will teach Strategies for communicating limits and needs.    Objective #B  Patient will learn & utilize at least 1-2 assertive communication skills weekly.  Status: cont     Intervention(s)  Therapist will Teach assertiveness and effective communication skills.    Objective #C  Patient will Identify negative self-talk and behaviors: challenge core beliefs, myths, and actions.  Status: cont     Intervention(s)  Therapist will Facilitate processing of her past connecting to current concerns and assisting client in challenging negative beliefs.  We will also assist client in developing strategies for building trust with others.        Patient has reviewed and agreed to the above plan.      Margarette Hernandez, [facility]  May 20, 2021

## 2022-03-22 ENCOUNTER — VIRTUAL VISIT (OUTPATIENT)
Dept: PSYCHOLOGY | Facility: CLINIC | Age: 27
End: 2022-03-22
Payer: COMMERCIAL

## 2022-03-22 DIAGNOSIS — F33.0 MDD (MAJOR DEPRESSIVE DISORDER), RECURRENT EPISODE, MILD (H): Primary | ICD-10-CM

## 2022-03-22 PROCEDURE — 90834 PSYTX W PT 45 MINUTES: CPT | Mod: 95 | Performed by: SOCIAL WORKER

## 2022-03-22 NOTE — PROGRESS NOTES
[facility]                                     Progress Note    Patient Name: Lizzy Olmstead  Date:3 /22/22         Service Type: Individual      Session Start Time: 3pm  Session End Time: 345     Session Length: 45    Session #: 12    Attendees: Client attended alone    Service Modality:  Video Visit:      Provider verified identity through the following two step process.  Patient provided:  Patient is known previously to provider    Telemedicine Visit: The patient's condition can be safely assessed and treated via synchronous audio and visual telemedicine encounter.      Reason for Telemedicine Visit: Patient has requested telehealth visit    Originating Site (Patient Location): Patient's home    Distant Site (Provider Location): Provider Remote Setting- Home Office    Consent:  The patient/guardian has verbally consented to: the potential risks and benefits of telemedicine (video visit) versus in person care; bill my insurance or make self-payment for services provided; and responsibility for payment of non-covered services.     Patient would like the video invitation sent by:  My Chart    Mode of Communication:  Video Conference via [employer]    As the provider I attest to compliance with applicable laws and regulations related to telemedicine.    DATA  Interactive Complexity: No  Crisis: No        Progress Since Last Session (Related to Symptoms / Goals / Homework):   Symptoms: Improving symptoms due to getting drivers lic and securing ft work    Homework: Achieved / completed to satisfaction      Episode of Care Goals: Satisfactory progress - ACTION (Actively working towards change); Intervened by reinforcing change plan / affirming steps taken     Current / Ongoing Stressors and Concerns:   Ended previous dating relationship and started another that feels better to her. Working on boundaries in all relationships inside and outside family. Lost grandmother recently. Working on her  objectives with more motivation.     Treatment Objective(s) Addressed in This Session:   boundary work   Follow through     Intervention:   Motivational Interviewing    MI Intervention: Expressed Empathy/Understanding, Supported Autonomy, Collaboration, Evocation, Change talk (evoked) and Reframe     Change Talk Expressed by the Patient: Desire to change Ability to change Reasons to change Need to change Activation    Provider Response to Change Talk: E - Evoked more info from patient about behavior change, A - Affirmed patient's thoughts, decisions, or attempts at behavior change, R - Reflected patient's change talk and S - Summarized patient's change talk statements      Assessments completed prior to visit:  The following assessments were completed by patient for this visit:  PHQ9:   PHQ-9 SCORE 7/5/2021 7/21/2021 8/3/2021 8/20/2021 9/3/2021 10/29/2021 1/19/2022   PHQ-9 Total Score - - - - - - -   PHQ-9 Total Score MyChart 1 (Minimal depression) 2 (Minimal depression) 1 (Minimal depression) 2 (Minimal depression) - 1 (Minimal depression) 0   PHQ-9 Total Score 1 2 1 2 1 1 0     GAD7:   DORIS-7 SCORE 9/2/2015 1/14/2021 3/18/2021 3/31/2021 5/19/2021 7/5/2021 1/19/2022   Total Score - - - - - - -   Total Score - - - - 1 (minimal anxiety) 1 (minimal anxiety) 0 (minimal anxiety)   Total Score 1 0 2 0 1 1 0     PROMIS 10-Global Health (only subscores and total score):   PROMIS-10 Scores Only 3/22/2022   Global Mental Health Score 14   Global Physical Health Score 17   PROMIS TOTAL - SUBSCORES 31         ASSESSMENT: Current Emotional / Mental Status (status of significant symptoms):   Risk status (Self / Other harm or suicidal ideation)   Patient denies current fears or concerns for personal safety.   Patient denies current or recent suicidal ideation or behaviors.   Patient denies current or recent homicidal ideation or behaviors.   Patient denies current or recent self injurious behavior or ideation.   Patient denies  other safety concerns.   Patient reports there has been no change in risk factors since their last session.     Patient reports there has been no change in protective factors since their last session.     Recommended that patient call 911 or go to the local ED should there be a change in any of these risk factors.     Appearance:   Appropriate    Eye Contact:   Fair    Psychomotor Behavior: Normal    Attitude:   Cooperative  Friendly   Orientation:   All   Speech    Rate / Production: Emotional Talkative    Volume:  Normal    Mood:    Depressed    Affect:    Appropriate    Thought Content:  Clear  Rumination    Thought Form:  Coherent  Goal Directed  Logical    Insight:    Fair      Medication Review:   No changes to current psychiatric medication(s)     Medication Compliance:   Yes     Changes in Health Issues:   None reported     Chemical Use Review:   Substance Use: Chemical use reviewed, no active concerns identified      Tobacco Use: No current tobacco use.      Diagnosis:  1. MDD (major depressive disorder), recurrent episode, mild (H)        Collateral Reports Completed:   Not Applicable    PLAN: (Patient Tasks / Therapist Tasks / Other)  Continue working on follow through  Continue boundary work        Margarette Hernandez, [facility] 3/22/22                                                         ______________________________________________________________________    Individual Treatment Plan    Patient's Name: Lizzy Olmstead  YOB: 1995    Date of Creation: 1/22  Date Treatment Plan Last Reviewed/Revised: 1/22 update4 /22    DSM5 Diagnoses: 296.31 (F33.0) Major Depressive Disorder, Recurrent Episode, Mild _ and With anxious distress  Psychosocial / Contextual Factors: financial stress; family stress  PROMIS (reviewed every 90 days): 31    Referral / Collaboration:  Referral to another professional/service is not indicated at this time..    Anticipated number of session for this episode of care:  eval every 90 days  Anticipation frequency of session: Monthly  Anticipated Duration of each session: 38-52 minutes  Treatment plan will be reviewed in 90 days or when goals have been changed.       Treatment Plan    Patient's Name: Lizzy Olmstead  YOB: 1995    Date: 1/19/22    DSM5 Diagnoses: 296.31 (F33.0) Major Depressive Disorder, Recurrent Episode, Mild _ and With anxious distress  Psychosocial / Contextual Factors: Problematic family of origin history; difficulty managing emotions and relationships and healthy manner.  WHODAS:     Referral / Collaboration:  Referral to another professional/service is not indicated at this time..    Anticipated number of session or this episode of care: Evaluate every 90 days      MeasurableTreatment Goal(s) related to diagnosis / functional impairment(s)  Goal 1: Patient will improve management of boundaries with others    I will know I've met my goal when I can function better in spite of a perception of judgment.      Objective #A (Patient Action)    Patient will compile a list of boundaries that they would like to set with others. Client will utilize these boundaries on a regular basis.  Status: continue     Intervention(s)  Therapist will teach Strategies for communicating limits and needs.    Objective #B  Patient will learn & utilize at least 1-2 assertive communication skills weekly.  Status: cont     Intervention(s)  Therapist will Teach assertiveness and effective communication skills.    Objective #C  Patient will Identify negative self-talk and behaviors: challenge core beliefs, myths, and actions.  Status: cont     Intervention(s)  Therapist will Facilitate processing of her past connecting to current concerns and assisting client in challenging negative beliefs.  We will also assist client in developing strategies for building trust with others.        Patient has reviewed and agreed to the above plan.      Margarette Hernandez, [facility]  May 20,  2021

## 2022-05-06 ENCOUNTER — VIRTUAL VISIT (OUTPATIENT)
Dept: PSYCHOLOGY | Facility: CLINIC | Age: 27
End: 2022-05-06
Payer: COMMERCIAL

## 2022-05-06 DIAGNOSIS — F33.0 MDD (MAJOR DEPRESSIVE DISORDER), RECURRENT EPISODE, MILD (H): Primary | ICD-10-CM

## 2022-05-06 PROCEDURE — 90834 PSYTX W PT 45 MINUTES: CPT | Mod: 95 | Performed by: SOCIAL WORKER

## 2022-05-06 NOTE — PROGRESS NOTES
Discharge Summary  Multiple Sessions    Client Name: Lizzy Olmstead MRN#: [id number] YOB: 1995    Discharge Date:   May 6, 2022    Service Modality: Video Visit:      Provider verified identity through the following two step process.  Patient provided:  Patient is known previously to provider    Telemedicine Visit: The patient's condition can be safely assessed and treated via synchronous audio and visual telemedicine encounter.      Reason for Telemedicine Visit: Patient has requested telehealth visit    Originating Site (Patient Location): Patient's home    Distant Site (Provider Location): Provider Remote Setting- Home Office    Consent:  The patient/guardian has verbally consented to: the potential risks and benefits of telemedicine (video visit) versus in person care; bill my insurance or make self-payment for services provided; and responsibility for payment of non-covered services.     Patient would like the video invitation sent by:  My Chart    Mode of Communication:  Video Conference via ViaBill    As the provider I attest to compliance with applicable laws and regulations related to telemedicine.    Service Type: Individual      Session Start Time: 230pm  Session End Time: 310pm      Session Length: 40     Session #: return     Attendees: Client attended alone      Focus of Treatment Objective(s):  Client's presenting concerns included: depressed mood; boundary issues  Stage of Change at time of Discharge: MAINTENANCE (Working to maintain change, with risk of relapse)    Medication Adherence:  Yes    Chemical Use:  NA    Assessment: Current Emotional / Mental Status (status of significant symptoms):    Risk status (Self / Other harm or suicidal ideation)  Client denies current fears or concerns for personal safety.  Client denies current or recent suicidal ideation or behaviors.  Client denies current or recent homicidal ideation or behaviors.  Client denies current or  recent self injurious behavior or ideation.  Client denies other safety concerns.  A safety and risk management plan has not been developed at this time, however client was given the after-hours number should there be a change in any of these risk factors.    Appearance:   Appropriate   Eye Contact:   Good   Psychomotor Behavior: Normal   Attitude:   Cooperative  Interested Pleasant Attentive  Orientation:   All  Speech   Rate / Production: Emotional Talkative Normal    Volume:  Normal   Mood:    Normal  Affect:    Appropriate   Thought Content:  Clear  Rumination   Thought Form:  Coherent  Goal Directed  Logical   Insight:   Good     DSM5 Diagnoses: (Sustained by DSM5 Criteria Listed Above)  Diagnoses: 296.31 (F33.0) Major Depressive Disorder, Recurrent Episode, Mild _ and With anxious distress  Psychosocial & Contextual Factors: childhood trauma; financial and educational stressors  WHODAS 2.0 (12 item) Score:     Reason for Discharge:  Client is satisfied with progress and Referred to [facility] therapist for ongoing monthly appointments      Aftercare Plan:  Client will follow-up with new transfer therapist to be determined. Referral made by current therapist.      Margarette Hernandez, JOSEG UADALUPE  May 6, 2022

## 2022-06-11 ENCOUNTER — HEALTH MAINTENANCE LETTER (OUTPATIENT)
Age: 27
End: 2022-06-11

## 2022-10-16 ENCOUNTER — HEALTH MAINTENANCE LETTER (OUTPATIENT)
Age: 27
End: 2022-10-16

## 2023-06-17 ENCOUNTER — HEALTH MAINTENANCE LETTER (OUTPATIENT)
Age: 28
End: 2023-06-17

## 2023-12-16 ENCOUNTER — OFFICE VISIT (OUTPATIENT)
Dept: URGENT CARE | Facility: URGENT CARE | Age: 28
End: 2023-12-16
Payer: COMMERCIAL

## 2023-12-16 VITALS
OXYGEN SATURATION: 99 % | SYSTOLIC BLOOD PRESSURE: 118 MMHG | DIASTOLIC BLOOD PRESSURE: 79 MMHG | HEART RATE: 113 BPM | TEMPERATURE: 99 F

## 2023-12-16 DIAGNOSIS — J02.0 ACUTE STREPTOCOCCAL PHARYNGITIS: ICD-10-CM

## 2023-12-16 DIAGNOSIS — R07.0 THROAT PAIN: Primary | ICD-10-CM

## 2023-12-16 LAB — DEPRECATED S PYO AG THROAT QL EIA: POSITIVE

## 2023-12-16 PROCEDURE — 87880 STREP A ASSAY W/OPTIC: CPT | Performed by: PHYSICIAN ASSISTANT

## 2023-12-16 PROCEDURE — 99213 OFFICE O/P EST LOW 20 MIN: CPT | Performed by: PHYSICIAN ASSISTANT

## 2023-12-16 RX ORDER — CEPHALEXIN 500 MG/1
500 CAPSULE ORAL 2 TIMES DAILY
Qty: 20 CAPSULE | Refills: 0 | Status: SHIPPED | OUTPATIENT
Start: 2023-12-16 | End: 2023-12-26

## 2023-12-16 NOTE — PROGRESS NOTES
Assessment & Plan        1. Acute streptococcal pharyngitis    -Patient is allergic to penicillins  - cephALEXin (KEFLEX) 500 MG capsule; Take 1 capsule (500 mg) by mouth 2 times daily for 10 days  Dispense: 20 capsule; Refill: 0    2. Throat pain    -Strep (+)  - Streptococcus A Rapid Screen w/Reflex to PCR - Clinic Collect    Results for orders placed or performed in visit on 12/16/23   Streptococcus A Rapid Screen w/Reflex to PCR - Clinic Collect     Status: Abnormal    Specimen: Throat; Swab   Result Value Ref Range    Group A Strep antigen Positive (A) Negative       Patient Instructions   Strep (+)  Take antibiotics as indicate in the AVS  Throw away your old toothbrush after taking the antibiotics for 24 hours  Supportive care (rest, adequate fluid intake, avoidance of respiratory irritants, soft diet)   Take acetaminophen or ibuprofen as needed for pain control and fever        Return if symptoms worsen or fail to improve, for Follow up.    At the end of the encounter, I discussed results, diagnosis, medications. Discussed red flags for immediate return to clinic/ER, as well as indications for follow up if no improvement. Patient understood and agreed to plan. Patient was stable for discharge.    Andrew Mcintyre is a 28 year old female who presents to clinic today  for the following health issues:  Chief Complaint   Patient presents with    Urgent Care     Pt was sick for about 4 days now symptoms have gotten better but still has a sore throat and white patches on back of throat.      HPI    Patient reports sore throat for 4 days.  She notes trouble swallowing, white exudate on the back of her throat.  She has been taking ibuprofen for pain which helps.  She denies fever or chills.      Review of Systems    Problem List:  2021-04: Morbid obesity (H)  2015-01: Irregular menstrual cycle  2015-01: Acne  2006-03: Attention deficit hyperactivity disorder (ADHD)      Past Medical History:   Diagnosis Date     NONSPECIFIC MEDICAL HISTORY     allergies       Social History     Tobacco Use    Smoking status: Never     Passive exposure: Yes    Smokeless tobacco: Never    Tobacco comments:     second hand exposure-mom smokes in the house.    Substance Use Topics    Alcohol use: No     Alcohol/week: 0.0 standard drinks of alcohol           Objective    /79 (BP Location: Right arm, Patient Position: Sitting, Cuff Size: Adult Large)   Pulse 113   Temp 99  F (37.2  C) (Tympanic)   SpO2 99%   Physical Exam  Constitutional:       Appearance: Normal appearance.   HENT:      Head: Normocephalic.      Mouth/Throat:      Mouth: Mucous membranes are moist.      Pharynx: Uvula midline. Pharyngeal swelling, oropharyngeal exudate and posterior oropharyngeal erythema present.   Cardiovascular:      Rate and Rhythm: Normal rate and regular rhythm.   Pulmonary:      Effort: Pulmonary effort is normal.      Breath sounds: Normal breath sounds.   Lymphadenopathy:      Head:      Right side of head: No submental, submandibular or tonsillar adenopathy.      Left side of head: No submental, submandibular or tonsillar adenopathy.      Cervical: Cervical adenopathy present.      Right cervical: Superficial cervical adenopathy present.      Left cervical: Superficial cervical adenopathy present.   Skin:     General: Skin is warm and dry.      Findings: No rash.   Neurological:      Mental Status: She is alert.   Psychiatric:         Mood and Affect: Mood normal.         Behavior: Behavior normal.              Delilah Poole PA-C

## 2023-12-16 NOTE — PATIENT INSTRUCTIONS
Strep (+)  Take antibiotics as indicate in the AVS  Throw away your old toothbrush after taking the antibiotics for 24 hours  Supportive care (rest, adequate fluid intake, avoidance of respiratory irritants, soft diet)   Take acetaminophen or ibuprofen as needed for pain control and fever

## 2024-08-10 ENCOUNTER — HEALTH MAINTENANCE LETTER (OUTPATIENT)
Age: 29
End: 2024-08-10

## 2024-09-10 ENCOUNTER — OFFICE VISIT (OUTPATIENT)
Dept: URGENT CARE | Facility: URGENT CARE | Age: 29
End: 2024-09-10

## 2024-09-10 VITALS
RESPIRATION RATE: 18 BRPM | HEIGHT: 62 IN | HEART RATE: 91 BPM | BODY MASS INDEX: 42.33 KG/M2 | DIASTOLIC BLOOD PRESSURE: 78 MMHG | OXYGEN SATURATION: 98 % | SYSTOLIC BLOOD PRESSURE: 117 MMHG | TEMPERATURE: 98.9 F | WEIGHT: 230 LBS

## 2024-09-10 DIAGNOSIS — K04.7 DENTAL ABSCESS: Primary | ICD-10-CM

## 2024-09-10 PROCEDURE — 99213 OFFICE O/P EST LOW 20 MIN: CPT | Performed by: PHYSICIAN ASSISTANT

## 2024-09-10 NOTE — PROGRESS NOTES
"Assessment & Plan     Dental abscess  Penicillin listed in her allergy profile however patient notes she has taken amoxicillin without any issues.  Augmentin is prescribed today.  Tylenol/Motrin as needed for pain.  Follow-up with a dentist for recheck.  Sooner if any worsening symptoms.  Patient agrees with the plan.  - amoxicillin-clavulanate (AUGMENTIN) 875-125 MG tablet  Dispense: 20 tablet; Refill: 0       Return in about 10 days (around 9/20/2024) for follow up with dentist for recheck and sooner if any worsening symptoms.    [facility] [place]    Andrew Mcintyre is a 28 year old female who presents to clinic today for the following health issues:  Chief Complaint   Patient presents with    Urgent Care     swelling on face/jaw? X 1.5 days no tooth pain  or abscess that she can find. Pressure in sinuses   Cannot take Keflex broke out in hives last time she took.        HPI  Patient is presenting to urgent care today with a complaint of right-sided facial swelling.  Onset of symptoms 2 days ago.  Patient reports pressure in the right sinus.  No trauma or injury to the affected area.  No known insect bites.  No fevers or chills.  No headache.  No vomiting or diarrhea.  No sore throat.  No ear pain. Treatment tried: Tylenol/      Review of Systems  Constitutional, HEENT, cardiovascular, pulmonary, GI, , musculoskeletal, neuro, skin, endocrine and psych systems are negative, except as otherwise noted.      Objective    /78   Pulse 91   Temp 98.9  F (37.2  C) (Oral)   Resp 18   Ht 1.575 m (5' 2\")   Wt 104.3 kg (230 lb)   LMP 08/19/2024 (Approximate)   SpO2 98%   BMI 42.07 kg/m    Physical Exam   GENERAL: alert and no distress  HENT: ear canals and TM's normal, mouth without ulcers or lesions, throat is moist and pink, tenderness to palpation/percussion right upper gumline  RESP: lungs clear to auscultation - no rales, rhonchi or wheezes  CV: regular rate " and rhythm, normal S1 S2  MS: no gross musculoskeletal defects noted, no edema  SKIN: Right sided facial swelling noted, no facial erythema

## 2025-08-16 ENCOUNTER — HEALTH MAINTENANCE LETTER (OUTPATIENT)
Age: 30
End: 2025-08-16